# Patient Record
Sex: FEMALE | Race: WHITE | NOT HISPANIC OR LATINO | Employment: STUDENT | URBAN - METROPOLITAN AREA
[De-identification: names, ages, dates, MRNs, and addresses within clinical notes are randomized per-mention and may not be internally consistent; named-entity substitution may affect disease eponyms.]

---
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
## 2024-01-30 ENCOUNTER — OFFICE VISIT (OUTPATIENT)
Dept: FAMILY MEDICINE CLINIC | Facility: CLINIC | Age: 19
End: 2024-01-30
Payer: COMMERCIAL

## 2024-01-30 VITALS
DIASTOLIC BLOOD PRESSURE: 72 MMHG | OXYGEN SATURATION: 91 % | SYSTOLIC BLOOD PRESSURE: 110 MMHG | BODY MASS INDEX: 28.41 KG/M2 | HEART RATE: 89 BPM | HEIGHT: 62 IN | WEIGHT: 154.4 LBS | TEMPERATURE: 96.8 F

## 2024-01-30 DIAGNOSIS — B94.8 PANDAS (PEDIATRIC AUTOIMMUNE NEUROPSYCHIATRIC DISEASE ASSOCIATED WITH STREPTOCOCCAL INFECTION): ICD-10-CM

## 2024-01-30 DIAGNOSIS — Z13.1 ENCOUNTER FOR SCREENING FOR DIABETES MELLITUS: ICD-10-CM

## 2024-01-30 DIAGNOSIS — F41.8 ANXIETY WITH DEPRESSION: ICD-10-CM

## 2024-01-30 DIAGNOSIS — Z76.89 ESTABLISHING CARE WITH NEW DOCTOR, ENCOUNTER FOR: Primary | ICD-10-CM

## 2024-01-30 DIAGNOSIS — Z13.6 ENCOUNTER FOR SCREENING FOR CARDIOVASCULAR DISORDERS: ICD-10-CM

## 2024-01-30 DIAGNOSIS — D89.89 PANDAS (PEDIATRIC AUTOIMMUNE NEUROPSYCHIATRIC DISEASE ASSOCIATED WITH STREPTOCOCCAL INFECTION): ICD-10-CM

## 2024-01-30 PROCEDURE — 99214 OFFICE O/P EST MOD 30 MIN: CPT | Performed by: NURSE PRACTITIONER

## 2024-01-30 NOTE — ASSESSMENT & PLAN NOTE
Patient diagnosed with anxiety by therapist 2 years ago.  Was initially on fluoxetine 5 mg, just had increase in dose, but patient did not start medication until 4 weeks ago.  Patient denies SI/HI.  Wishes to get reconnected with behavioral health therapist.  Referrals entered.  Additional resources given to patient via handout.  Advised to obtain blood work as ordered, return in 4 weeks for reevaluation

## 2024-01-30 NOTE — PROGRESS NOTES
Name: Cristina Pemberton      : 2005      MRN: [id number]  Encounter Provider: EVELIO Greco  Encounter Date: 2024   Encounter department: [address]    Assessment & Plan     1. Establishing care with new doctor, encounter for    2. Anxiety with depression  Assessment & Plan:  Patient diagnosed with anxiety by therapist 2 years ago.  Was initially on fluoxetine 5 mg, just had increase in dose, but patient did not start medication until 4 weeks ago.  Patient denies SI/HI.  Wishes to get reconnected with behavioral health therapist.  Referrals entered.  Additional resources given to patient via handout.  Advised to obtain blood work as ordered, return in 4 weeks for reevaluation    Orders:  -     Comprehensive metabolic panel; Future  -     CBC and differential; Future  -     TSH, 3rd generation with Free T4 reflex; Future  -     Vitamin D 25 hydroxy; Future  -     Vitamin B12; Future  -     Folate; Future  -     Ambulatory referral to Psych Services; Future    3. Encounter for screening for cardiovascular disorders  -     Lipid panel; Future    4. Encounter for screening for diabetes mellitus  -     Comprehensive metabolic panel; Future  -     Hemoglobin A1C; Future    5. PANDAS (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)   Assessment & Plan:  Patient currently denies symptoms at this time.  Was referred to neurology, but never followed up with them.  Continue to monitor, recent referral to neurology if needed.    Orders:  -     Ambulatory referral to Psych Services; Future         Depression Screening Follow-up Plan: Patient's depression screening was positive with a PHQ-2 score of 4. Their PHQ-9 score was 12. Patient assessed for underlying major depression. They have no active suicidal ideations. Brief counseling provided and recommend additional follow-up/re-evaluation next office visit.    Subjective       Patient presents office for establishment of care.  Recently seen by [facility].  Last well exam August 2023.  Patient is established with OB/GYN.  Patient with history of POTS, anxiety, migraines.  Patient was established with behavioral health therapist, but then her therapist left the practice and she has not been able to establish with a new one.  Patient currently being treated for anxiety with fluoxetine.  Had recent increase in dose, but did not start new dose right away until 4 weeks ago.  Patient denies panic attacks, physical symptoms.  Denies SI/HI, auditory/visual hallucinations.      Review of Systems   Constitutional:  Negative for activity change, appetite change and fatigue.   HENT:  Negative for sore throat and trouble swallowing.    Eyes:  Negative for photophobia and visual disturbance.   Respiratory:  Negative for cough and shortness of breath.    Cardiovascular:  Negative for chest pain and palpitations.   Gastrointestinal:  Negative for abdominal pain, diarrhea and vomiting.   Genitourinary:  Negative for decreased urine volume.   Musculoskeletal:  Negative for arthralgias and myalgias.   Skin:  Negative for color change and rash.   Neurological:  Negative for dizziness, weakness, light-headedness and headaches.   Psychiatric/Behavioral:  Negative for agitation, confusion, dysphoric mood, self-injury, sleep disturbance and suicidal ideas. The patient is not nervous/anxious.        Current Outpatient Medications on File Prior to Visit   Medication Sig    ascorbic acid (VITAMIN C) 250 mg tablet Take 250 mg by mouth daily    betamethasone, augmented, (DIPROLENE-AF) 0.05 % cream Apply topically 2 (two) times a day    BIOTIN PO Take 1 capsule by mouth daily    cetirizine (ZyrTEC) 10 mg tablet Take 10 mg by mouth daily    FLUoxetine (PROzac) 20 mg capsule Take 1 capsule (20 mg total) by mouth daily    VITAMIN D PO Take by mouth    metoprolol succinate (TOPROL-XL) 25 mg 24 hr  "tablet Take 1 tablet (25 mg total) by mouth daily (Patient not taking: Reported on 1/30/2024)    [DISCONTINUED] metoclopramide (REGLAN) 10 mg tablet Take 1 tablet (10 mg total) by mouth every 6 (six) hours as needed (n/v or headache) (Patient not taking: Reported on 1/30/2024)    [DISCONTINUED] midodrine (PROAMATINE) 5 mg tablet Take 5 mg by mouth 2 (two) times a day (Patient not taking: Reported on 1/30/2024)    [DISCONTINUED] naproxen (NAPROSYN) 500 mg tablet Take 1 tablet (500 mg total) by mouth 2 (two) times a day with meals (Patient not taking: Reported on 1/30/2024)    [DISCONTINUED] ondansetron (ZOFRAN-ODT) 4 mg disintegrating tablet Take 1 tablet (4 mg total) by mouth every 8 (eight) hours as needed for nausea or vomiting (Patient not taking: Reported on 1/30/2024)       Objective     /72 (BP Location: Left arm, Patient Position: Sitting, Cuff Size: Standard)   Pulse 89   Temp (!) 96.8 °F (36 °C) (Tympanic)   Ht 5' 2\" (1.575 m)   Wt 70 kg (154 lb 6.4 oz)   LMP 12/22/2023 (Approximate)   SpO2 91% Comment: nails long and dark color  BMI 28.24 kg/m²     Physical Exam  Constitutional:       General: She is not in acute distress.     Appearance: Normal appearance. She is not ill-appearing.   HENT:      Head: Normocephalic and atraumatic.      Right Ear: Tympanic membrane, ear canal and external ear normal.      Left Ear: Tympanic membrane, ear canal and external ear normal.      Nose: Nose normal.      Mouth/Throat:      Mouth: Mucous membranes are moist.      Pharynx: Oropharynx is clear.   Eyes:      Conjunctiva/sclera: Conjunctivae normal.      Pupils: Pupils are equal, round, and reactive to light.   Cardiovascular:      Rate and Rhythm: Normal rate and regular rhythm.      Pulses: Normal pulses.      Heart sounds: Normal heart sounds. No murmur heard.  Pulmonary:      Effort: Pulmonary effort is normal.      Breath sounds: Normal breath sounds.   Abdominal:      General: Bowel sounds are normal. "      Palpations: Abdomen is soft.   Musculoskeletal:         General: Normal range of motion.      Cervical back: Normal range of motion and neck supple.   Skin:     General: Skin is warm and dry.   Neurological:      General: No focal deficit present.      Mental Status: She is alert and oriented to person, place, and time.   Psychiatric:         Attention and Perception: Attention and perception normal. She does not perceive auditory or visual hallucinations.         Mood and Affect: Mood and affect normal.         Speech: Speech normal.         Behavior: Behavior normal. Behavior is cooperative.         Thought Content: Thought content normal. Thought content does not include homicidal or suicidal ideation. Thought content does not include homicidal or suicidal plan.       EVELIO Greco

## 2024-02-02 ENCOUNTER — TELEPHONE (OUTPATIENT)
Dept: PSYCHIATRY | Facility: CLINIC | Age: 19
End: 2024-02-02

## 2024-02-02 NOTE — TELEPHONE ENCOUNTER
PSR attempted to reach out to patient in reference to referral placed by PCP for integrated therapist. PSR stated if patient is still interested to reach out to patient's PCP office or PSR directly to schedule with integrated therapist's wait list. PSR stated direct line.

## 2024-02-16 DIAGNOSIS — F41.9 ANXIETY: ICD-10-CM

## 2024-02-16 RX ORDER — FLUOXETINE HYDROCHLORIDE 20 MG/1
20 CAPSULE ORAL DAILY
Qty: 90 CAPSULE | Refills: 0 | Status: SHIPPED | OUTPATIENT
Start: 2024-02-16

## 2024-02-27 ENCOUNTER — TELEPHONE (OUTPATIENT)
Dept: FAMILY MEDICINE CLINIC | Facility: CLINIC | Age: 19
End: 2024-02-27

## 2024-02-27 NOTE — TELEPHONE ENCOUNTER
Called patient and n/a she was at work per mom. I did let her mom know that I was trying to reach her in regards to her apt today, she was supposed to have labs done but hasn't had the chance yet. Mom said she is afraid of needles so she probably is putting it off. She will make sure she has it done and will have Cristina call back to reschedule her apt after work.

## 2024-04-19 ENCOUNTER — OFFICE VISIT (OUTPATIENT)
Dept: FAMILY MEDICINE CLINIC | Facility: CLINIC | Age: 19
End: 2024-04-19
Payer: COMMERCIAL

## 2024-04-19 VITALS
DIASTOLIC BLOOD PRESSURE: 81 MMHG | HEART RATE: 73 BPM | WEIGHT: 148 LBS | SYSTOLIC BLOOD PRESSURE: 123 MMHG | HEIGHT: 62 IN | OXYGEN SATURATION: 98 % | TEMPERATURE: 98.1 F | BODY MASS INDEX: 27.23 KG/M2

## 2024-04-19 DIAGNOSIS — F41.8 ANXIETY WITH DEPRESSION: Primary | ICD-10-CM

## 2024-04-19 PROBLEM — R00.0 TACHYCARDIA: Status: RESOLVED | Noted: 2022-12-05 | Resolved: 2024-04-19

## 2024-04-19 PROBLEM — R10.2 PELVIC PAIN: Status: RESOLVED | Noted: 2023-03-24 | Resolved: 2024-04-19

## 2024-04-19 PROBLEM — N93.9 ABNORMAL UTERINE BLEEDING (AUB): Status: RESOLVED | Noted: 2023-03-24 | Resolved: 2024-04-19

## 2024-04-19 PROCEDURE — 99213 OFFICE O/P EST LOW 20 MIN: CPT | Performed by: FAMILY MEDICINE

## 2024-04-19 NOTE — LETTER
April 19, 2024     Patient: Cristina Pemberton  YOB: 2005  Date of Visit: 4/19/2024      To Whom it May Concern:    Cristina Pemberton is under my professional care. Cristina was seen in my office on 4/19/2024. Cristina is currently not on Fluoxetine and has not been taking this medication for the past 8 months.    If you have any questions or concerns, please don't hesitate to call.         Sincerely,          Jennifer Gaytan MD        CC: No Recipients

## 2024-04-19 NOTE — PROGRESS NOTES
Assessment/Plan:         Problem List Items Addressed This Visit        Behavioral Health    Anxiety with depression - Primary     Patient is doing well off medications. Letter provided.    Subjective:      Patient ID: Cristina Pemberton is a 19 y.o. female.    19 year old here for a review of medications. She was on Fluoxetine but stopped taking it about 8 months ago.  Anxiety has been okay, she says it is manageable.  She is planning to go into the [employer] and had to be off medications in order to qualify.  She needs a letter stating she is not on medications. She had trouble finding a therapist, so is not in therapy.         The following portions of the patient's history were reviewed and updated as appropriate:   Past Medical History:  She has a past medical history of Acute pharyngitis due to other specified organisms (10/28/2018), Anxiety, Anxiety with depression (1/30/2024), Asthma, Depression, Hamstring injury, left, initial encounter (05/31/2018), Headache(784.0), Heart murmur, Hypertension, PANDAS (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), and SIRS (systemic inflammatory response syndrome) (HCC) (12/05/2022).,  _______________________________________________________________________  Medical Problems:  does not have any pertinent problems on file.,  _______________________________________________________________________  Past Surgical History:   has a past surgical history that includes Eye surgery.,  _______________________________________________________________________  Family History:  family history includes ADD / ADHD in her father; Allergies in her father and mother; Arrhythmia in her paternal grandfather; Asthma in her sister; Breast cancer in her other and paternal grandmother; Endometriosis in her mother; Interstitial cystitis in her mother; Mental illness in her maternal grandmother; Miscarriages / Stillbirths in her mother; Thyroid disease in her mother;  "Uterine cancer in her other.,  _______________________________________________________________________  Social History:   reports that she has never smoked. She has never been exposed to tobacco smoke. She has never used smokeless tobacco. She reports current drug use. Drug: Marijuana. She reports that she does not drink alcohol.,  _______________________________________________________________________  Allergies:  is allergic to pollen extract..  _______________________________________________________________________  Current Outpatient Medications   Medication Sig Dispense Refill   • ascorbic acid (VITAMIN C) 250 mg tablet Take 250 mg by mouth daily     • betamethasone, augmented, (DIPROLENE-AF) 0.05 % cream Apply topically 2 (two) times a day 30 g 0   • BIOTIN PO Take 1 capsule by mouth daily     • cetirizine (ZyrTEC) 10 mg tablet Take 10 mg by mouth daily  5   • VITAMIN D PO Take by mouth       No current facility-administered medications for this visit.     _______________________________________________________________________  Review of Systems   Psychiatric/Behavioral:  Negative for dysphoric mood. The patient is not nervous/anxious.          Objective:  Vitals:    04/19/24 1404   BP: 123/81   Pulse: 73   Temp: 98.1 °F (36.7 °C)   SpO2: 98%   Weight: 67.1 kg (148 lb)   Height: 5' 2\" (1.575 m)     Body mass index is 27.07 kg/m².     Physical Exam  Vitals and nursing note reviewed.   Constitutional:       General: She is not in acute distress.     Appearance: She is well-developed. She is not diaphoretic.   HENT:      Head: Normocephalic and atraumatic.   Cardiovascular:      Rate and Rhythm: Normal rate and regular rhythm.   Pulmonary:      Effort: Pulmonary effort is normal. No respiratory distress.      Breath sounds: Normal breath sounds.   Neurological:      General: No focal deficit present.      Mental Status: She is alert.   Psychiatric:         Mood and Affect: Mood normal.         Behavior: Behavior " normal.         Thought Content: Thought content normal.         Judgment: Judgment normal.         ELBA-7 Flowsheet Screening    Flowsheet Row Most Recent Value   Over the last 2 weeks, how often have you been bothered by any of the following problems?    Feeling nervous, anxious, or on edge 1   Not being able to stop or control worrying 0   Worrying too much about different things 1   Trouble relaxing 0   Being so restless that it is hard to sit still 0   Becoming easily annoyed or irritable 0   Feeling afraid as if something awful might happen 0   ELBA-7 Total Score 2

## 2024-07-22 ENCOUNTER — NURSE TRIAGE (OUTPATIENT)
Age: 19
End: 2024-07-22

## 2024-07-22 NOTE — TELEPHONE ENCOUNTER
Pt calling with concerns for high testosterone levels - symptoms of late periods, excessive hair growth, cramping, acne, body odor. Not on birth control pills. Pt states has had these symptoms prior. Was placed on birth control pill for management, but is not interested in that option again. RN scheduled pt for next available visit. Pt agreeable to plan, no further questions.

## 2024-09-24 ENCOUNTER — COSMETIC (OUTPATIENT)
Age: 19
End: 2024-09-24

## 2024-09-24 VITALS
SYSTOLIC BLOOD PRESSURE: 96 MMHG | HEIGHT: 62 IN | OXYGEN SATURATION: 97 % | BODY MASS INDEX: 26.13 KG/M2 | DIASTOLIC BLOOD PRESSURE: 71 MMHG | TEMPERATURE: 97.7 F | WEIGHT: 142 LBS | HEART RATE: 84 BPM

## 2024-09-24 DIAGNOSIS — Z41.1 ENCOUNTER FOR COSMETIC SURGERY: Primary | ICD-10-CM

## 2024-09-24 PROCEDURE — COSCON COSMETIC CONSULTATION: Performed by: PLASTIC SURGERY

## 2024-09-24 NOTE — PROGRESS NOTES
Patient is a very pleasant 19-year-old female who is here today with her mother to discuss possible body contouring with a Brazilian butt lift.  Patient has no significant medical conditions, she is a non-smoker.  Her mother has some autoimmune related disease, however she has been tested and does not have  genetic markers for autoimmune disease or blood clot.  She is here today as she is unhappy with the shape and contour mainly of her lateral hip region.  The patient is extremely committed to a workout routine, she finds that despite her conditioning she finds this area difficult to achieve the contour that she wants.  She is interested today in possible grafting to the hip region and buttock.    Physical exam--patient with very mild to moderate localized adiposity in the forearms, abdomen and lateral flank, on the lower hip the patient does have a classic hip dip type contour deformity.  She overall has excellent shape and aesthetic contour to her buttock.      Discussion--I had a lengthy discussion with the patient regarding her options, I do feel that the patient would be an excellent candidate for fat grafting to the hip and buttock region.  We discussed donor site, I did discuss with her that donor site availability of her fat is her limiting issue as she would not have enough donor fat for a traditional Brazilian type butt lift  Which requires several liters of fat.  However using a combination of donor sites, particularly her lower flank and grafting to her hip depression would significantly improve her shape and contour with a mild to moderate improvement in her overall buttock volume.  I discussed the nature of fat grafting, the risks, benefits, alternatives including the risk of bleeding, infection, scarring, under correction, overcorrection, fat necrosis, fat emboli, need for revision, fat resorption, need for multiple procedures, asymmetry, poor aesthetic result, we discussed the expected hospital  stay, the expected recovery time, all her questions were answered to her satisfaction, overall patient is an excellent candidate although I did discuss with her that it may be difficult to completely correct the area.  Patient understands and agrees, she will be provided with pricing information and literature regarding her procedure.  She can follow-up if and when she decides on surgery.

## 2024-10-01 ENCOUNTER — TELEPHONE (OUTPATIENT)
Age: 19
End: 2024-10-01

## 2024-10-01 NOTE — TELEPHONE ENCOUNTER
Pt's mom Megan calling to check status of cosmetic quote from consult with Dr Jenkins on 9/24    Per Marquis Juarez emailed quote to email on [employer] yesterday, advised to be sure and check spam/junk folders    Mom would like Marquis to call her to discuss, as she is helping pt pay for and coordinate surgery    Please call mom Megan back at [phone]

## 2024-10-02 ENCOUNTER — TELEPHONE (OUTPATIENT)
Dept: PLASTIC SURGERY | Facility: CLINIC | Age: 19
End: 2024-10-02

## 2024-10-09 ENCOUNTER — PREP FOR PROCEDURE (OUTPATIENT)
Dept: PLASTIC SURGERY | Facility: CLINIC | Age: 19
End: 2024-10-09

## 2024-10-09 DIAGNOSIS — Z41.1 ENCOUNTER FOR COSMETIC SURGERY: Primary | ICD-10-CM

## 2024-10-15 ENCOUNTER — TELEPHONE (OUTPATIENT)
Age: 19
End: 2024-10-15

## 2024-10-15 NOTE — TELEPHONE ENCOUNTER
Patient will like for you to contact her regarding the surgery the different locations blood work & EKG

## 2024-10-25 NOTE — PROGRESS NOTES
Diagnoses and all orders for this visit:    Encounter for gynecological examination without abnormal finding    Screening for STDs (sexually transmitted diseases)  -     Hepatitis C antibody; Future  -     RPR-Syphilis Screening (Total Syphilis IGG/IGM); Future  -     Hepatitis B surface antigen; Future  -     HIV 1/2 AG/AB w Reflex [facility] for 2 yr old and above; Future  -     Chlamydia/GC amplified DNA by PCR        Perineal hygiene reviewed   Weight bearing exercises minium of 150 mins/weekly advised.   Kegel exercises recommended.   Reviewed safe sex practices, Condoms encouraged with all sexual activity to prevent STI's  SBE encouraged, ASCCP guidelines reviewed will start Pap's at age 21.   Gardisil vaccines recommended up to age 45  Calcium/ Vit D dietary requirements discussed,   Advised to call with any issues,  all concerns & questions addressed.   See after visit summary for further information and recommendations to the above mentioned subjects which we may or may not have covered in detail during your visit   F/U Annually and PRN      Health Maintenance:    Gardisil: Not completed   Gardasil 9 was advised for prevention of HPV-related disease. We discussed risks/benefits, SE's/AE's at length and all questions were answered. Written info was provided for review. The patient agrees to consider and is aware she may call to schedule injection #1 at any time.      Subjective    CC: Yearly Exam      Cristina Pemberton is a 19 y.o. female here for an annual exam.   GYN hx includes:      Family hx of: PGM- Breast cancer  (alive ) . Other family member with Breast cancer & uterine cancer   Medically stable, reports no changes in medical Hx, follows with PMD    Patient's last menstrual period was 10/16/2024 (exact date).  Her menstrual cycles are regular every 28-30 days. She denies issues with bleeding during her menses. Cycles last approx 4 days     She denies breast concerns, abnormal vaginal discharge,  vaginal itching, odor, irritation, bowel/bladder dysfunction, urinary symptoms, pelvic pain, or dyspareunia today.   She is sexually active, recent episode of unprotected intercourse with previous partner .  Her current method of contraception includes condoms. Denies any issues with her BCM.   Information provided on available birth control methods and discussion about morning-after pill  She does want STD testing today.  Denies intimate partner violence    Past Medical History:   Diagnosis Date    Acute pharyngitis due to other specified organisms 10/28/2018    Last Assessment & Plan:  Rapid Strep Negative-likely viral Treat with OTC cold medications and analgesics Throat Culture sent to lab Parents advised they will contacted with results within 48 hours if Positive    Anxiety     Anxiety with depression 01/30/2024    Asthma     Depression     Hamstring injury, left, initial encounter 05/31/2018    Headache(784.0)     Heart murmur     Hypertension     low    PANDAS (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)  ([facility])     SIRS (systemic inflammatory response syndrome) ([facility]) 12/05/2022     Past Surgical History:   Procedure Laterality Date    EYE SURGERY         Immunization History   Administered Date(s) Administered    DTP 2004    DTaP 2004, 2004, 2004, 06/01/2005    DTaP 5 2004, 2004, 06/01/2005, 05/20/2009    Hep A, adult 06/24/2010, 03/31/2011    Hep B / HiB 2004, 2004, 06/01/2005    Hep B, Adolescent or Pediatric 2004    Hep B, adult 2004, 2004, 06/01/2005    Hib (PRP-OMP) 2004, 2004, 06/01/2005    IPV 2004, 2004, 09/06/2005, 05/20/2009    MMR 03/04/2005, 04/16/2008    Meningococcal 08/28/2015    Meningococcal MCV4, Unspecified 06/09/2020    Meningococcal MCV4P 06/09/2020    Meningococcal, Unknown Serogroups 08/28/2015    Pneumococcal Conjugate PCV 7 2004, 2004,  2004, 2005    Tdap 2015    Tuberculin Skin Test-PPD Intradermal 2009    Varicella 2005, 2008       Family History   Problem Relation Age of Onset    Miscarriages / Stillbirths Mother     Thyroid disease Mother     Allergies Mother         Morphine    Interstitial cystitis Mother     Endometriosis Mother     Allergies Father         Ampicillin    ADD / ADHD Father     Asthma Sister     Mental illness Maternal Grandmother     Breast cancer Paternal Grandmother     Arrhythmia Paternal Grandfather     Breast cancer Other     Uterine cancer Other      Social History     Tobacco Use    Smoking status: Never     Passive exposure: Current    Smokeless tobacco: Never   Vaping Use    Vaping status: Never Used   Substance Use Topics    Alcohol use: Yes     Comment: social    Drug use: Yes     Types: Marijuana       Current Outpatient Medications:     ascorbic acid (VITAMIN C) 250 mg tablet, Take 250 mg by mouth daily, Disp: , Rfl:     BIOTIN PO, Take 1 capsule by mouth daily, Disp: , Rfl:     cetirizine (ZyrTEC) 10 mg tablet, Take 10 mg by mouth daily, Disp: , Rfl: 5    VITAMIN D PO, Take by mouth, Disp: , Rfl:   Patient Active Problem List    Diagnosis Date Noted    Anxiety with depression 2024    Migraine with aura and without status migrainosus, not intractable 05/15/2023    Hand, foot and mouth disease 05/15/2023    Environmental and seasonal allergies 2019    History of PANDAS 10/28/2018    Neuropathic pain, leg, bilateral 2017    Neurologic gait dysfunction 2017    Cough variant asthma 2017    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 2005       Allergies   Allergen Reactions    Pollen Extract        OB History    Para Term  AB Living   0 0 0 0 0 0   SAB IAB Ectopic Multiple Live Births   0 0 0 0 0       Vitals:    10/30/24 1333   BP: 110/68   BP Location: Left arm   Patient Position: Sitting   Cuff Size: Large   Weight: 64.9 kg (143  "lb)   Height: 5' 2\" (1.575 m)     Body mass index is 26.16 kg/m².    Review of Systems     Constitutional: Negative for chills, fatigue, fever, headaches, visual disturbances, and unexpected weight change.   Respiratory: Negative for cough, & shortness of breath.  Cardiovascular: Negative for chest pain. .    Gastrointestinal: Negative for Abd pain, nausea & vomiting, constipation and diarrhea.   Genitourinary: Negative for difficulty urinating, dysuria, hematuria, dyspareunia, unusual vaginal bleeding or discharge  Skin: Negative skin changes    Physical Exam     Constitutional: Alert & Oriented x3, well-developed and well-nourished. No distress.   HENT: Atraumatic, Normocephalic, Conjunctivae clear  Neck: Normal range of motion. Neck supple. No thyromegaly, mass, nodules or tenderness  Pulmonary: Effort normal.   Abdominal: Soft. No tenderness or masses  Musculoskeletal: Normal ROM  Skin: Warm & Dry  Psychological: Normal mood, thought content, behavior & judgement     Breasts:   Right: tissue soft without masses, tenderness, skin changes or nipple discharge. No areas of erythema or pain. No subclavicular, axillary, pectoral adenopathy  Left:  tissue soft without masses, tenderness, skin changes or nipple discharge. No areas of erythema or pain. No subclavicular, axillary, pectoral adenopathy    Pelvic exam was performed with patient supine, lithotomy position.      Labia: Negative rash, tenderness, lesion or injury on the right labia.              Negative rash, tenderness, lesion or injury on the left labia.   Urethral meatus:  Negative for  tenderness, inflammation or discharge.   Uterus: not deviated, enlarged, fixed or tender.   Cervix: No CMT, no discharge or friability.   Right adnexa: no mass, no tenderness and no fullness.  Left adnexa: no mass, no tenderness and no fullness.   Vagina: No erythema, tenderness, masses, or foreign body in the vagina. No signs of injury around the vagina. No unusual vaginal " discharge   Perineum without lesions, signs of injury, erythema or swelling.  Inguinal Canal:        Right: No inguinal adenopathy or hernia present.        Left: No inguinal adenopathy or hernia present.

## 2024-10-29 ENCOUNTER — APPOINTMENT (OUTPATIENT)
Dept: PREADMISSION TESTING | Facility: HOSPITAL | Age: 19
End: 2024-10-29
Payer: SELF-PAY

## 2024-10-30 ENCOUNTER — ANNUAL EXAM (OUTPATIENT)
Dept: OBGYN CLINIC | Facility: CLINIC | Age: 19
End: 2024-10-30
Payer: COMMERCIAL

## 2024-10-30 ENCOUNTER — APPOINTMENT (OUTPATIENT)
Dept: LAB | Facility: HOSPITAL | Age: 19
End: 2024-10-30
Payer: SELF-PAY

## 2024-10-30 VITALS
BODY MASS INDEX: 26.31 KG/M2 | SYSTOLIC BLOOD PRESSURE: 110 MMHG | DIASTOLIC BLOOD PRESSURE: 68 MMHG | WEIGHT: 143 LBS | HEIGHT: 62 IN

## 2024-10-30 DIAGNOSIS — Z01.419 ENCOUNTER FOR GYNECOLOGICAL EXAMINATION WITHOUT ABNORMAL FINDING: Primary | ICD-10-CM

## 2024-10-30 DIAGNOSIS — Z11.3 SCREENING FOR STDS (SEXUALLY TRANSMITTED DISEASES): ICD-10-CM

## 2024-10-30 DIAGNOSIS — Z13.1 ENCOUNTER FOR SCREENING FOR DIABETES MELLITUS: ICD-10-CM

## 2024-10-30 DIAGNOSIS — F41.8 ANXIETY WITH DEPRESSION: ICD-10-CM

## 2024-10-30 LAB
25(OH)D3 SERPL-MCNC: 26.9 NG/ML (ref 30–100)
ALBUMIN SERPL BCG-MCNC: 4.4 G/DL (ref 3.5–5)
ALP SERPL-CCNC: 49 U/L (ref 34–104)
ALT SERPL W P-5'-P-CCNC: 20 U/L (ref 7–52)
ANION GAP SERPL CALCULATED.3IONS-SCNC: 4 MMOL/L (ref 4–13)
AST SERPL W P-5'-P-CCNC: 19 U/L (ref 13–39)
BASOPHILS # BLD AUTO: 0.03 THOUSANDS/ÂΜL (ref 0–0.1)
BASOPHILS NFR BLD AUTO: 0 % (ref 0–1)
BILIRUB SERPL-MCNC: 0.46 MG/DL (ref 0.2–1)
BUN SERPL-MCNC: 16 MG/DL (ref 5–25)
CALCIUM SERPL-MCNC: 9.4 MG/DL (ref 8.4–10.2)
CHLORIDE SERPL-SCNC: 104 MMOL/L (ref 96–108)
CO2 SERPL-SCNC: 31 MMOL/L (ref 21–32)
CREAT SERPL-MCNC: 0.78 MG/DL (ref 0.6–1.3)
EOSINOPHIL # BLD AUTO: 0.12 THOUSAND/ÂΜL (ref 0–0.61)
EOSINOPHIL NFR BLD AUTO: 1 % (ref 0–6)
ERYTHROCYTE [DISTWIDTH] IN BLOOD BY AUTOMATED COUNT: 12.5 % (ref 11.6–15.1)
EST. AVERAGE GLUCOSE BLD GHB EST-MCNC: 97 MG/DL
FOLATE SERPL-MCNC: >22.3 NG/ML
GFR SERPL CREATININE-BSD FRML MDRD: 109 ML/MIN/1.73SQ M
GLUCOSE SERPL-MCNC: 75 MG/DL (ref 65–140)
HBA1C MFR BLD: 5 %
HCT VFR BLD AUTO: 39.1 % (ref 34.8–46.1)
HGB BLD-MCNC: 13 G/DL (ref 11.5–15.4)
IMM GRANULOCYTES # BLD AUTO: 0.04 THOUSAND/UL (ref 0–0.2)
IMM GRANULOCYTES NFR BLD AUTO: 0 % (ref 0–2)
LYMPHOCYTES # BLD AUTO: 3.18 THOUSANDS/ÂΜL (ref 0.6–4.47)
LYMPHOCYTES NFR BLD AUTO: 27 % (ref 14–44)
MCH RBC QN AUTO: 30.3 PG (ref 26.8–34.3)
MCHC RBC AUTO-ENTMCNC: 33.2 G/DL (ref 31.4–37.4)
MCV RBC AUTO: 91 FL (ref 82–98)
MONOCYTES # BLD AUTO: 0.75 THOUSAND/ÂΜL (ref 0.17–1.22)
MONOCYTES NFR BLD AUTO: 6 % (ref 4–12)
NEUTROPHILS # BLD AUTO: 7.6 THOUSANDS/ÂΜL (ref 1.85–7.62)
NEUTS SEG NFR BLD AUTO: 66 % (ref 43–75)
NRBC BLD AUTO-RTO: 0 /100 WBCS
PLATELET # BLD AUTO: 255 THOUSANDS/UL (ref 149–390)
PMV BLD AUTO: 8.9 FL (ref 8.9–12.7)
POTASSIUM SERPL-SCNC: 3.4 MMOL/L (ref 3.5–5.3)
PROT SERPL-MCNC: 7.3 G/DL (ref 6.4–8.4)
RBC # BLD AUTO: 4.29 MILLION/UL (ref 3.81–5.12)
SODIUM SERPL-SCNC: 139 MMOL/L (ref 135–147)
TSH SERPL DL<=0.05 MIU/L-ACNC: 1.08 UIU/ML (ref 0.45–4.5)
VIT B12 SERPL-MCNC: 432 PG/ML (ref 180–914)
WBC # BLD AUTO: 11.72 THOUSAND/UL (ref 4.31–10.16)

## 2024-10-30 PROCEDURE — 82746 ASSAY OF FOLIC ACID SERUM: CPT

## 2024-10-30 PROCEDURE — 36415 COLL VENOUS BLD VENIPUNCTURE: CPT

## 2024-10-30 PROCEDURE — 86803 HEPATITIS C AB TEST: CPT

## 2024-10-30 PROCEDURE — 87591 N.GONORRHOEAE DNA AMP PROB: CPT | Performed by: OBSTETRICS & GYNECOLOGY

## 2024-10-30 PROCEDURE — S0612 ANNUAL GYNECOLOGICAL EXAMINA: HCPCS | Performed by: OBSTETRICS & GYNECOLOGY

## 2024-10-30 PROCEDURE — 80053 COMPREHEN METABOLIC PANEL: CPT

## 2024-10-30 PROCEDURE — 82306 VITAMIN D 25 HYDROXY: CPT

## 2024-10-30 PROCEDURE — 86780 TREPONEMA PALLIDUM: CPT

## 2024-10-30 PROCEDURE — 83036 HEMOGLOBIN GLYCOSYLATED A1C: CPT

## 2024-10-30 PROCEDURE — 87389 HIV-1 AG W/HIV-1&-2 AB AG IA: CPT

## 2024-10-30 PROCEDURE — 85025 COMPLETE CBC W/AUTO DIFF WBC: CPT

## 2024-10-30 PROCEDURE — 87491 CHLMYD TRACH DNA AMP PROBE: CPT | Performed by: OBSTETRICS & GYNECOLOGY

## 2024-10-30 PROCEDURE — 87340 HEPATITIS B SURFACE AG IA: CPT

## 2024-10-30 PROCEDURE — 82607 VITAMIN B-12: CPT

## 2024-10-30 PROCEDURE — 84443 ASSAY THYROID STIM HORMONE: CPT

## 2024-10-30 NOTE — PATIENT INSTRUCTIONS
Patient Education     Lowering Your Risk of Breast Cancer   About this topic   Breast cancer is a serious illness. Breast cancer is when abnormal cells grow and divide more quickly in your breast. These cells form a growth or tumor. The abnormal cells may enter nearby tissue and spread to other parts of the body. It is the type of cancer most often seen in women. Men can have breast cancer, but it is a rare condition.  General   Some things in your life may increase your risk of breast cancer. You may not be able to change some of these. Others you can control.  You are more likely to get breast cancer if you:  Have a mother, sister, or daughter who has had breast cancer  Have used hormones for menopause for more than 5 years  Have had radiation therapy to the breast or chest in the past  Are overweight or do not exercise  Had your first menstrual period before you were 10 years old  Went through menopause after age 54  Have never been pregnant or had your first child after age 34  Have had breast cancer before  Drink alcohol in any form  Have dense breasts  Are older in age  There is no certain way to prevent breast cancer. There are things you can do to lower your chances of having breast cancer.  Keep a healthy weight. Lose weight if you are overweight. Being overweight raises your chances of having breast cancer.  Eat a healthy diet to maintain a healthy weight, such as more fruits, vegetables, and lean cuts of meat. Decrease the amount of saturated fat in your diet.  Exercise. Being active helps you keep a healthy weight.  Limit your alcohol intake or do not drink alcohol. The more alcohol you drink, the higher your risk.  Do not smoke cigarettes. Smoking can increase your risk of many types of cancer.  Breastfeed your baby. This may help protect you. The longer you breastfeed, the more protection you have.  Talk with your doctor about:  Limiting or stopping hormone therapy.  Taking certain drugs to prevent  breast cancer. For women at high risk of having breast cancer, there are a few drugs that may lower your risk.  Surgery to prevent you from having breast cancer if you are very high risk.  When do I need to call the doctor?   Changes in your breasts  A lump or area in your breast that feels different  Discharge from your nipple  Skin on your breast is dimpled or indented  You have questions or concerns about your breasts  Helpful tips   Talk to your doctor about the best kind of breast cancer screening for you.  If you want to do self breast exams, have your doctor show you the right way to do them.  Tell your doctor of any abnormal finding.  Last Reviewed Date   2021-10-04  Consumer Information Use and Disclaimer   This generalized information is a limited summary of diagnosis, treatment, and/or medication information. It is not meant to be comprehensive and should be used as a tool to help the user understand and/or assess potential diagnostic and treatment options. It does NOT include all information about conditions, treatments, medications, side effects, or risks that may apply to a specific patient. It is not intended to be medical advice or a substitute for the medical advice, diagnosis, or treatment of a health care provider based on the health care provider's examination and assessment of a patient’s specific and unique circumstances. Patients must speak with a health care provider for complete information about their health, medical questions, and treatment options, including any risks or benefits regarding use of medications. This information does not endorse any treatments or medications as safe, effective, or approved for treating a specific patient. UpToDate, Inc. and its affiliates disclaim any warranty or liability relating to this information or the use thereof. The use of this information is governed by the Terms of Use, available at  https://www.[employer] Technologiesuwer.com/en/know/clinical-effectiveness-terms   Copyright   Copyright © 2024 [employer]Date, Inc. and its affiliates and/or licensors. All rights reserved.       Perineal Hygiene      Your vaginal naturally takes care of its self, it is a self washing system, the less you mess the healthier it will be     No soaps or feminine wash to the vulva, these products can cause dermitis, bacterial infections and other vulvar problems.   Use only water to cleanse, or water with Dove or Dove Sensitive Skin Bar soap if necessary.    Avoid the use of washcloths, exfoliating dmitry's, netted scrubbers, loofa's, use your hands only to cleanse the vulvar tissues    No scented lotions or products are advised in or near your vulva.    Use coconut oil in its solid form for moisture if needed.  No douching this may cause imbalance in your vaginal PH and further issues.    If you wear panty liners, you may apply a thin coating of Vaseline, A&D ointment or coconut oil to the vulvar tissues as a skin barrier     Cotton underware, loose fitting clothing  Only perfume-free, dye-free laundry detergent, use a second rinse cycle   Avoid fabric softeners/dryer sheets.       Your partner should avoid the same products as well.       Over the counter probiotic to restore vaginal susannah may be helpful as well, take daily.       You may also look into Boric Acid vaginal suppositories to restore vaginal PH balance for up to 2 weeks as directed on the box. You may not use these if you are pregnant      For vaginal dryness:      You may use:     Coconut oil in solid form, not liquid (organic, pure, unscented) as needed for moisture or lubrication. ( Do not use if allergic)       Replens moisture restore external comfort gel daily ( use as directed on the box)        Replens long lasting vaginal moisturizer  ( use as directed on the box)     May try CribFrog vulvar moisturizer ( found on Amazon )    May try Revaree vaginal inserts        For  "Vaginal Lubrication:          You may use:     Coconut oil in solid form, not liquid (organic, pure, unscented) as a lubricant or another scent-free lubricant (Astroglide, Uberlube) if needed.  Do not use coconut oil or silicone if using a condom as this may break down the integrity of the condom and cause an unplanned pregnancy              Do not use coconut oil if allergic               Replens silky smooth lubricant, premium silicone based lubricant for intercourse. ( use as directed, a small amount will provide an enhanced natural feeling)     Any premium over the counter vaginal lubricant water or silicone based. Silicone based will have more staying power.        For Vulvar irritation/itching:        You may use Hydrocortisone 1 % over the counter to external vulvar tissues 2 x daily for 5-7 days to help with irriation and itch relief.  Patient Education     HPV (Human Papillomavirus) Vaccine CDC Vaccine Information Statement (VIS)   About this topic       Patient Education     Sexually transmitted infections   The Basics   Written by the doctors and editors at UpToDate   What are sexually transmitted infections? -- Sexually transmitted infections (\"STIs\") are infections you can get through sex. They are also called sexually transmitted diseases (\"STDs\"). Most STIs are caused by bacteria or viruses.  The most common STIs include:   Chlamydia   Gonorrhea   Mycoplasma genitalium   Genital herpes, also called herpes simplex virus (\"HSV\")   Genital warts, also called human papillomavirus (\"HPV\") - Some types of HPV can cause cancer of the cervix, penis, or anus.   Hepatitis B   Syphilis   Trichomoniasis   Human immunodeficiency virus (\"HIV\") - This is the virus that causes AIDS.  Many of these infections can be spread through any type of sex. That includes vaginal, anal, and oral sex, as well as other types of sex play. HIV and hepatitis B can be spread in other ways, too, such as exposure to body fluids.  Other " "infections can also be spread through sex. These include:   Hepatitis A and hepatitis C - These are other types of hepatitis viruses. They are usually spread in other ways. But they can also be spread through sex.   Zika virus - This is usually spread through mosquito bites. But it can also be spread through sex.   Neisseria meningitidis - This can be spread during close contact, including through sex. It has caused small outbreaks of bacterial meningitis in males who have sex with males.   Mpox (also known as monkeypox) - This is a viral infection that can be spread during close contact, including through sex.  What is STI screening? -- \"Screening\" means testing for STIs in a person who does not have symptoms. Screening is very important. That's because STIs often do not cause symptoms, so a person can have an STI and not know it.  Doctors recommend that people who are at risk for STIs be screened even if they feel healthy. For example, you could be at risk for chlamydia if you had unprotected sex with a new partner. Screening for chlamydia will show if you do have this infection, so you can get treatment. Treatment will prevent the infection from getting worse and keep you from infecting other people.  There are different types of tests that screen for different infections. Many STIs can be found through a blood or urine test. If you decide to get screened for STIs, your doctor or nurse can work with you to figure out which specific tests you need.  Who should be tested for STIs? -- If you think you might have an STI, or if you had sex with someone who you know has an STI, you should get tested.  For screening, different tests are appropriate for different people, depending on their sex and sex habits. Here are some general guidelines:   All people (including teens) should get screened at least once for HIV.   All females younger than 25 years who have had sex should be screened every year for gonorrhea and " chlamydia.   Females older than 25 who have sex with more than 1 partner and do not use condoms should be screened every year for gonorrhea and chlamydia.   Males who have sex with males should be screened at least once a year for HIV, syphilis, chlamydia, and gonorrhea. This should include testing of any body parts that could be infected, including the rectum. Males who have sex with males should also be screened at least once for hepatitis A, B, and C.   Pregnant people should be screened for syphilis, HIV, and hepatitis B. They should also be screened for chlamydia and gonorrhea if they are younger than 25 years or have more than 1 sex partner. Some pregnant people might also need to be screened for other infections, depending on their sex habits.   All people who are infected with HIV should be screened at least once for hepatitis A, B, and C. They should also be screened at least once a year for syphilis, chlamydia, and gonorrhea. Females who are infected with HIV should be screened at least once a year for trichomonas. Males who are infected with HIV, and who have sex with males with HIV, should be screened at least once a year for hepatitis C.   Transgender and gender-diverse people should be screened based on their anatomy and sex habits.  Some people might need other screening tests depending on their sex habits and other factors. If you are unsure whether you should be screened and for what, ask your doctor or nurse for advice.  Where can I get an STI test? -- There are a few options:   If you see a doctor or nurse regularly, you can ask them to test you.   If you prefer, you can go to a clinic. To find a clinic near you, check with your local Department of Health or visit [email or web address]. Some clinics let you get tested without giving your name (anonymously).  Be careful with any pharmacies or online stores that sell kits to use at home to screen for STIs. For some of these tests, you send in a  "sample, and then you get the results either by phone or online. For others, you do a test at home and get results within an hour. But it is not always clear which test kits are ones you can trust. If you do use 1 of these kits and get a positive result, follow up with a doctor or nurse. And if you get a negative result but think that you might have an infection, see a doctor or nurse.  What symptoms should I watch for? -- In general, watch out for any genital itching, burning, sores, or discharge. But be aware that many STIs do not cause any symptoms. The best way to know for sure if you have an STI is to be tested.  How are STIs treated? -- The right treatment depends on the type of STI you have. Treatment might include antibiotics or medicines called \"antivirals,\" which fight viruses. Treatment can cure your infection or keep it from getting worse. It can also lower the chance that you spread the infection to others.  If you do have an infection, you might need to tell the people you could have infected. Your doctor or nurse can help you figure out which partners you need to tell based on when you last had sex with them.  Can STIs be prevented? -- There is no definite way to prevent all STIs. But there are things you can do to lower your chances of getting one:   The most important thing you can do is to use a condom every time you have sex. Both external (male) and internal (female) condoms can protect against STIs. But be aware that condoms made out of \"natural materials,\" such as sheep intestine, do not protect against STIs.   Ask your doctor if there are any vaccines you should get. If you are 26 years old or younger, you can get a vaccine to protect against HPV, the virus that causes genital warts and cervical cancer. Some older people can also get the HPV vaccine. If you do not have hepatitis A or B and have not already gotten the vaccine for hepatitis A or B, you can get those vaccines, too. Your doctor " might also suggest a meningitis or mpox vaccine if you are at risk.   If your partner has herpes, they can take a medicine called valacyclovir (brand name: Valtrex). This lowers the chances of passing the infection to you.   If you are at very high risk for certain STIs, talk to your doctor about whether you should take medicines to lower the risk. For example:   Taking an antiviral pill every day can lower the risk of getting HIV.   For some people, taking an antibiotic pill after sex can lower the risk of getting chlamydia, syphilis, and gonorrhea. But this does not work in everyone.  All topics are updated as new evidence becomes available and our peer review process is complete.  This topic retrieved from [employer] on: May 02, 2024.  Topic 17039 Version 17.0  Release: 32.4.3 - C32.122  © 2024 UpToDate, Inc. and/or its affiliates. All rights reserved.  Consumer Information Use and Disclaimer   Disclaimer: This generalized information is a limited summary of diagnosis, treatment, and/or medication information. It is not meant to be comprehensive and should be used as a tool to help the user understand and/or assess potential diagnostic and treatment options. It does NOT include all information about conditions, treatments, medications, side effects, or risks that may apply to a specific patient. It is not intended to be medical advice or a substitute for the medical advice, diagnosis, or treatment of a health care provider based on the health care provider's examination and assessment of a patient's specific and unique circumstances. Patients must speak with a health care provider for complete information about their health, medical questions, and treatment options, including any risks or benefits regarding use of medications. This information does not endorse any treatments or medications as safe, effective, or approved for treating a specific patient. UpToDate, Inc. and its affiliates disclaim any warranty or  liability relating to this information or the use thereof.The use of this information is governed by the Terms of Use, available at https://www.wolterskluwer.com/en/know/clinical-effectiveness-terms. 2024© UpToDate, Inc. and its affiliates and/or licensors. All rights reserved.  Copyright   © 2024 [employer] and/or its affiliates. All rights reserved.

## 2024-10-31 ENCOUNTER — TELEPHONE (OUTPATIENT)
Age: 19
End: 2024-10-31

## 2024-10-31 DIAGNOSIS — A74.9 CHLAMYDIA INFECTION: Primary | ICD-10-CM

## 2024-10-31 LAB
C TRACH DNA SPEC QL NAA+PROBE: POSITIVE
HBV SURFACE AG SER QL: NORMAL
HCV AB SER QL: NORMAL
HIV 1+2 AB+HIV1 P24 AG SERPL QL IA: NORMAL
HIV 2 AB SERPL QL IA: NORMAL
HIV1 AB SERPL QL IA: NORMAL
HIV1 P24 AG SERPL QL IA: NORMAL
N GONORRHOEA DNA SPEC QL NAA+PROBE: NEGATIVE
TREPONEMA PALLIDUM IGG+IGM AB [PRESENCE] IN SERUM OR PLASMA BY IMMUNOASSAY: NORMAL

## 2024-10-31 RX ORDER — DOXYCYCLINE 100 MG/1
100 TABLET ORAL 2 TIMES DAILY
Qty: 14 TABLET | Refills: 0 | Status: SHIPPED | OUTPATIENT
Start: 2024-10-31 | End: 2024-11-01 | Stop reason: SDUPTHER

## 2024-10-31 NOTE — TELEPHONE ENCOUNTER
Patient returning phone call and was notified of Teresa's recommendations, Pt verbalized understanding, no further questions at this time

## 2024-10-31 NOTE — RESULT ENCOUNTER NOTE
Pls advise pt her chlamydia was positive. I will send antibiotics to her pharmacy. Pls advise partner notification and treatment. Recheck in 3 months advised.    (Covering for Ewa)

## 2024-10-31 NOTE — TELEPHONE ENCOUNTER
----- Message from EVELIO Garcia sent at 10/31/2024  3:08 PM EDT -----  Pls advise pt her chlamydia was positive. I will send antibiotics to her pharmacy. Pls advise partner notification and treatment. Recheck in 3 months advised.    (Covering for Ewa)

## 2024-11-01 ENCOUNTER — TELEPHONE (OUTPATIENT)
Age: 19
End: 2024-11-01

## 2024-11-01 DIAGNOSIS — A74.9 CHLAMYDIA INFECTION: ICD-10-CM

## 2024-11-01 RX ORDER — DOXYCYCLINE 100 MG/1
100 TABLET ORAL 2 TIMES DAILY
Qty: 14 TABLET | Refills: 0 | Status: SHIPPED | OUTPATIENT
Start: 2024-11-01 | End: 2024-11-08

## 2024-11-01 NOTE — TELEPHONE ENCOUNTER
Patient called and advised she lost the medication she just picked up and would like it sent again.    doxycycline (ADOXA) 100 MG tablet    Unreliable history. Likely secondary to psychiatric issues   no need to rule out cardiac etiology or further tele monitoring including syncope work up per discussion with attending   EKG shows sinus bradycardia at 52 without any significant st or t wave changes   Orthostatic's negative   Troponin 1 is negative   No need for echo or carotid Dopplers at this time .F/u ct head to rule out any intracranial pathology or bleed

## 2024-11-01 NOTE — TELEPHONE ENCOUNTER
Spoke with patient regarding provider note below.  Pt verbalized understanding, no further questions or concerns at this time.       Hello,  All of your STD blood work is normal. Great news!     Enjoy your day.  Thank you,  Katherine FRASER

## 2024-11-01 NOTE — PRE-PROCEDURE INSTRUCTIONS
Pre-Surgery Instructions:   Medication Instructions    ascorbic acid (VITAMIN C) 250 mg tablet Avoid 1 week prior to surgery      BIOTIN PO Avoid 1 week prior to surgery      cetirizine (ZyrTEC) 10 mg tablet Do not take day of surgery; continue as prescribed excluding DOS     doxycycline (ADOXA) 100 MG tablet Continue as prescribed- should be completed prior to DOS- notify surgeon if infection persists    VITAMIN D PO Avoid 1 week prior to surgery      Medication instructions for day surgery reviewed. Please use only a sip of water to take your instructed medications. Avoid all over the counter vitamins, supplements and NSAIDS for one week prior to surgery per anesthesia guidelines. Tylenol is ok to take as needed.     You will receive a call one business day prior to surgery with an arrival time and hospital directions. If your surgery is scheduled on a Monday, the hospital will be calling you on the Friday prior to your surgery. If you have not heard from anyone by 8pm, please call the hospital supervisor through the hospital  at [phone]. ([place] [phone] or [place] [phone]).    Do not eat or drink anything after midnight the night before your surgery, including candy, mints, lifesavers, or chewing gum. Do not drink alcohol 24hrs before your surgery. Try not to smoke at least 24hrs before your surgery.       Follow the pre surgery showering instructions as listed in the “My Surgical Experience Booklet” or otherwise provided by your surgeon's office. Do not use a blade to shave the surgical area 1 week before surgery. It is okay to use a clean electric clippers up to 24 hours before surgery. Do not apply any lotions, creams, including makeup, cologne, deodorant, or perfumes after showering on the day of your surgery. Do not use dry shampoo, hair spray, hair gel, or any type of hair products.     No contact lenses, eye make-up, or artificial eyelashes. Remove nail polish, including gel  polish, and any artificial, gel, or acrylic nails if possible. Remove all jewelry including rings and body piercing jewelry.     Wear causal clothing that is easy to take on and off. Consider your type of surgery.    Keep any valuables, jewelry, piercings at home. Please bring any specially ordered equipment (sling, braces) if indicated.    Arrange for a responsible person to drive you to and from the hospital on the day of your surgery. Please confirm the visitor policy for the day of your procedure when you receive your phone call with an arrival time.     Call the surgeon's office with any new illnesses, exposures, or additional questions prior to surgery.    Please reference your “My Surgical Experience Booklet” for additional information to prepare for your upcoming surgery.

## 2024-11-06 ENCOUNTER — COSMETIC (OUTPATIENT)
Dept: PLASTIC SURGERY | Facility: CLINIC | Age: 19
End: 2024-11-06

## 2024-11-06 VITALS
DIASTOLIC BLOOD PRESSURE: 64 MMHG | TEMPERATURE: 97.9 F | HEIGHT: 62 IN | RESPIRATION RATE: 18 BRPM | SYSTOLIC BLOOD PRESSURE: 110 MMHG | BODY MASS INDEX: 26.68 KG/M2 | WEIGHT: 145 LBS | HEART RATE: 71 BPM | OXYGEN SATURATION: 99 %

## 2024-11-06 DIAGNOSIS — Z41.1 ENCOUNTER FOR COSMETIC SURGERY: Primary | ICD-10-CM

## 2024-11-08 ENCOUNTER — TELEPHONE (OUTPATIENT)
Dept: PLASTIC SURGERY | Facility: CLINIC | Age: 19
End: 2024-11-08

## 2024-11-11 ENCOUNTER — APPOINTMENT (OUTPATIENT)
Dept: LAB | Facility: HOSPITAL | Age: 19
End: 2024-11-11
Attending: PLASTIC SURGERY
Payer: SELF-PAY

## 2024-11-11 DIAGNOSIS — Z41.1 ENCOUNTER FOR COSMETIC SURGERY: ICD-10-CM

## 2024-11-11 LAB
ANION GAP SERPL CALCULATED.3IONS-SCNC: 8 MMOL/L (ref 4–13)
BASOPHILS # BLD AUTO: 0.02 THOUSANDS/ÂΜL (ref 0–0.1)
BASOPHILS NFR BLD AUTO: 0 % (ref 0–1)
BUN SERPL-MCNC: 16 MG/DL (ref 5–25)
CALCIUM SERPL-MCNC: 8.8 MG/DL (ref 8.4–10.2)
CHLORIDE SERPL-SCNC: 104 MMOL/L (ref 96–108)
CO2 SERPL-SCNC: 25 MMOL/L (ref 21–32)
CREAT SERPL-MCNC: 0.75 MG/DL (ref 0.6–1.3)
EOSINOPHIL # BLD AUTO: 0.19 THOUSAND/ÂΜL (ref 0–0.61)
EOSINOPHIL NFR BLD AUTO: 2 % (ref 0–6)
ERYTHROCYTE [DISTWIDTH] IN BLOOD BY AUTOMATED COUNT: 12.6 % (ref 11.6–15.1)
GFR SERPL CREATININE-BSD FRML MDRD: 115 ML/MIN/1.73SQ M
GLUCOSE P FAST SERPL-MCNC: 87 MG/DL (ref 65–99)
HCT VFR BLD AUTO: 40.9 % (ref 34.8–46.1)
HGB BLD-MCNC: 13.6 G/DL (ref 11.5–15.4)
IMM GRANULOCYTES # BLD AUTO: 0.02 THOUSAND/UL (ref 0–0.2)
IMM GRANULOCYTES NFR BLD AUTO: 0 % (ref 0–2)
LYMPHOCYTES # BLD AUTO: 2.31 THOUSANDS/ÂΜL (ref 0.6–4.47)
LYMPHOCYTES NFR BLD AUTO: 23 % (ref 14–44)
MCH RBC QN AUTO: 30.2 PG (ref 26.8–34.3)
MCHC RBC AUTO-ENTMCNC: 33.3 G/DL (ref 31.4–37.4)
MCV RBC AUTO: 91 FL (ref 82–98)
MONOCYTES # BLD AUTO: 0.63 THOUSAND/ÂΜL (ref 0.17–1.22)
MONOCYTES NFR BLD AUTO: 6 % (ref 4–12)
NEUTROPHILS # BLD AUTO: 6.98 THOUSANDS/ÂΜL (ref 1.85–7.62)
NEUTS SEG NFR BLD AUTO: 69 % (ref 43–75)
NRBC BLD AUTO-RTO: 0 /100 WBCS
PLATELET # BLD AUTO: 237 THOUSANDS/UL (ref 149–390)
PMV BLD AUTO: 8.6 FL (ref 8.9–12.7)
POTASSIUM SERPL-SCNC: 3.9 MMOL/L (ref 3.5–5.3)
RBC # BLD AUTO: 4.51 MILLION/UL (ref 3.81–5.12)
SODIUM SERPL-SCNC: 137 MMOL/L (ref 135–147)
WBC # BLD AUTO: 10.15 THOUSAND/UL (ref 4.31–10.16)

## 2024-11-11 PROCEDURE — 36415 COLL VENOUS BLD VENIPUNCTURE: CPT

## 2024-11-11 PROCEDURE — 85025 COMPLETE CBC W/AUTO DIFF WBC: CPT

## 2024-11-11 PROCEDURE — 80048 BASIC METABOLIC PNL TOTAL CA: CPT

## 2024-11-11 NOTE — PROGRESS NOTES
Assessment & Plan   19-year-old female for liposuction and fat grafting to the bilateral buttocks and hip  1.)  Reviewed the risks, benefits, alternatives of fat grafting, namely the risk of under correction  2.)  We identified her donor sites once again  3.)  All her questions answered to her satisfaction  4.)  Informed consent obtained and signed, will proceed to the OR as scheduled.          Discussion--I had a lengthy discussion with the patient regarding her options, I do feel that the patient would be an excellent candidate for fat grafting to the hip and buttock region.  We discussed donor site, I did discuss with her that donor site availability of her fat is her limiting issue as she would not have enough donor fat for a traditional Brazilian type butt lift  Which requires several liters of fat.  However using a combination of donor sites, particularly her lower flank and grafting to her hip depression would significantly improve her shape and contour with a mild to moderate improvement in her overall buttock volume.  I discussed the nature of fat grafting, the risks, benefits, alternatives including the risk of bleeding, infection, scarring, under correction, overcorrection, fat necrosis, fat emboli, need for revision, fat resorption, need for multiple procedures, asymmetry, poor aesthetic result, we discussed the expected hospital stay, the expected recovery time, all her questions were answered to her satisfaction, overall patient is an excellent candidate although I did discuss with her that it may be difficult to completely correct the area.  Patient understands and agrees, all her questions answered to her satisfaction.  Informed consent obtained and signed.  Will proceed to the OR as scheduled.            Subjective   Patient ID: Cristina Pemberton is a 19 y.o. female.    Vitals:    11/06/24 1434   BP: 110/64   Pulse: 71   Resp: 18   Temp: 97.9 °F (36.6 °C)   SpO2: 99%     HPI  Patient is a very pleasant  19-year-old female who is here today with her mother to discuss upcoming body contouring with a Brazilian butt lift.  Patient has no significant medical conditions, she is a non-smoker.  Her mother has some autoimmune related disease, however she has been tested and does not have  genetic markers for autoimmune disease or blood clot.  She is here today as she is unhappy with the shape and contour mainly of her lateral hip region.  The patient is extremely committed to a workout routine, she finds that despite her conditioning she finds this area difficult to achieve the contour that she wants.  We are planning for fat grafting to her buttock and hip region.  She is completing a course of antibiotics for chlamydia infection.      The following portions of the patient's history were reviewed and updated as appropriate: allergies, current medications, past family history, past medical history, past social history, past surgical history, and problem list.    Review of Systems   All other systems reviewed and are negative.    Objective   Physical Exam   Constitutional  She appears well-developed and well-nourished.     Eyes  Pupils are equal, round, and reactive to light. System normal.     General -             Right: Right eye extraocular movements are normal.             Left: Left eye extraocular movements are normal.     Cardiovascular: Normal rate and regular rhythm.     Pulmonary/Chest  Effort normal and breath sounds normal.     Skin    Physical exam--patient with very mild to moderate localized adiposity in the forearms, abdomen and lateral flank, on the lower hip the patient does have a classic hip dip type contour deformity.  She overall has excellent shape and aesthetic contour to her buttock.       Psychiatric  She has a normal mood and affect. Her behavior is normal. Judgment and thought content normal.       Past Medical History:   Diagnosis Date   • Acute pharyngitis due to other specified organisms  10/28/2018    Last Assessment & Plan:  Rapid Strep Negative-likely viral Treat with OTC cold medications and analgesics Throat Culture sent to lab Parents advised they will contacted with results within 48 hours if Positive   • Anxiety    • Anxiety with depression 01/30/2024   • Asthma    • Depression    • Hamstring injury, left, initial encounter 05/31/2018   • Headache(784.0)    • Heart murmur    • PANDAS (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)  ([facility])    • SIRS (systemic inflammatory response syndrome) ([facility]) 12/05/2022     Past Surgical History:   Procedure Laterality Date   • EYE SURGERY Left     muscle suregry     Current Outpatient Medications   Medication Instructions   • ascorbic acid (VITAMIN C) 250 mg, Oral, Daily   • BIOTIN PO 1 capsule, Oral, Daily   • cetirizine (ZYRTEC) 10 mg, Oral, Daily   • VITAMIN D PO Oral       Social History     Social History Narrative   • Not on file     Social History     Tobacco Use   Smoking Status Never   • Passive exposure: Current   Smokeless Tobacco Never

## 2024-11-12 ENCOUNTER — ANESTHESIA EVENT (OUTPATIENT)
Dept: PERIOP | Facility: HOSPITAL | Age: 19
End: 2024-11-12

## 2024-11-12 ENCOUNTER — HOSPITAL ENCOUNTER (OUTPATIENT)
Facility: HOSPITAL | Age: 19
Setting detail: OUTPATIENT SURGERY
Discharge: HOME | End: 2024-11-12
Attending: PLASTIC SURGERY | Admitting: PLASTIC SURGERY
Payer: SELF-PAY

## 2024-11-12 ENCOUNTER — ANESTHESIA (OUTPATIENT)
Dept: PERIOP | Facility: HOSPITAL | Age: 19
End: 2024-11-12

## 2024-11-12 VITALS
SYSTOLIC BLOOD PRESSURE: 95 MMHG | RESPIRATION RATE: 18 BRPM | HEIGHT: 62 IN | WEIGHT: 146.39 LBS | OXYGEN SATURATION: 100 % | BODY MASS INDEX: 26.94 KG/M2 | DIASTOLIC BLOOD PRESSURE: 48 MMHG | HEART RATE: 103 BPM | TEMPERATURE: 97.2 F

## 2024-11-12 DIAGNOSIS — Z41.1 ENCOUNTER FOR COSMETIC SURGERY: Primary | ICD-10-CM

## 2024-11-12 LAB
EXT PREGNANCY TEST URINE: NEGATIVE
EXT. CONTROL: NORMAL

## 2024-11-12 PROCEDURE — NC001 PR NO CHARGE: Performed by: PLASTIC SURGERY

## 2024-11-12 PROCEDURE — 81025 URINE PREGNANCY TEST: CPT | Performed by: PHYSICIAN ASSISTANT

## 2024-11-12 PROCEDURE — 15771 GRFG AUTOL FAT LIPO 50 CC/<: CPT | Performed by: PLASTIC SURGERY

## 2024-11-12 RX ORDER — SODIUM CHLORIDE, SODIUM LACTATE, POTASSIUM CHLORIDE, CALCIUM CHLORIDE 600; 310; 30; 20 MG/100ML; MG/100ML; MG/100ML; MG/100ML
INJECTION, SOLUTION INTRAVENOUS CONTINUOUS PRN
Status: DISCONTINUED | OUTPATIENT
Start: 2024-11-12 | End: 2024-11-12

## 2024-11-12 RX ORDER — PROPOFOL 10 MG/ML
INJECTION, EMULSION INTRAVENOUS AS NEEDED
Status: DISCONTINUED | OUTPATIENT
Start: 2024-11-12 | End: 2024-11-12

## 2024-11-12 RX ORDER — SODIUM CHLORIDE 9 MG/ML
125 INJECTION, SOLUTION INTRAVENOUS CONTINUOUS
Status: DISCONTINUED | OUTPATIENT
Start: 2024-11-12 | End: 2024-11-12 | Stop reason: HOSPADM

## 2024-11-12 RX ORDER — MAGNESIUM HYDROXIDE 1200 MG/15ML
LIQUID ORAL AS NEEDED
Status: DISCONTINUED | OUTPATIENT
Start: 2024-11-12 | End: 2024-11-12 | Stop reason: HOSPADM

## 2024-11-12 RX ORDER — ACETAMINOPHEN 325 MG/1
975 TABLET ORAL ONCE
Status: COMPLETED | OUTPATIENT
Start: 2024-11-12 | End: 2024-11-12

## 2024-11-12 RX ORDER — ACETAMINOPHEN 500 MG
1000 TABLET ORAL EVERY 6 HOURS
Qty: 56 TABLET | Refills: 0 | Status: SHIPPED | OUTPATIENT
Start: 2024-11-12 | End: 2024-11-19

## 2024-11-12 RX ORDER — DEXAMETHASONE SODIUM PHOSPHATE 10 MG/ML
INJECTION, SOLUTION INTRAMUSCULAR; INTRAVENOUS AS NEEDED
Status: DISCONTINUED | OUTPATIENT
Start: 2024-11-12 | End: 2024-11-12

## 2024-11-12 RX ORDER — HYDROMORPHONE HCL/PF 1 MG/ML
SYRINGE (ML) INJECTION AS NEEDED
Status: DISCONTINUED | OUTPATIENT
Start: 2024-11-12 | End: 2024-11-12

## 2024-11-12 RX ORDER — EPHEDRINE SULFATE 50 MG/ML
INJECTION INTRAVENOUS AS NEEDED
Status: DISCONTINUED | OUTPATIENT
Start: 2024-11-12 | End: 2024-11-12

## 2024-11-12 RX ORDER — PHENYLEPHRINE HCL IN 0.9% NACL 1 MG/10 ML
SYRINGE (ML) INTRAVENOUS AS NEEDED
Status: DISCONTINUED | OUTPATIENT
Start: 2024-11-12 | End: 2024-11-12

## 2024-11-12 RX ORDER — CYCLOBENZAPRINE HCL 10 MG
10 TABLET ORAL 3 TIMES DAILY
Qty: 21 TABLET | Refills: 0 | Status: SHIPPED | OUTPATIENT
Start: 2024-11-12 | End: 2024-11-19

## 2024-11-12 RX ORDER — CEFAZOLIN SODIUM 2 G/50ML
2000 SOLUTION INTRAVENOUS ONCE
Status: COMPLETED | OUTPATIENT
Start: 2024-11-12 | End: 2024-11-12

## 2024-11-12 RX ORDER — OXYCODONE HYDROCHLORIDE 5 MG/1
5 TABLET ORAL EVERY 4 HOURS PRN
Status: CANCELLED | OUTPATIENT
Start: 2024-11-12

## 2024-11-12 RX ORDER — HYDROMORPHONE HCL/PF 1 MG/ML
0.5 SYRINGE (ML) INJECTION
Status: DISCONTINUED | OUTPATIENT
Start: 2024-11-12 | End: 2024-11-12 | Stop reason: HOSPADM

## 2024-11-12 RX ORDER — MIDAZOLAM HYDROCHLORIDE 2 MG/2ML
INJECTION, SOLUTION INTRAMUSCULAR; INTRAVENOUS AS NEEDED
Status: DISCONTINUED | OUTPATIENT
Start: 2024-11-12 | End: 2024-11-12

## 2024-11-12 RX ORDER — GABAPENTIN 300 MG/1
300 CAPSULE ORAL 3 TIMES DAILY
Qty: 21 CAPSULE | Refills: 0 | Status: SHIPPED | OUTPATIENT
Start: 2024-11-12 | End: 2024-11-19

## 2024-11-12 RX ORDER — LIDOCAINE HYDROCHLORIDE 10 MG/ML
INJECTION, SOLUTION EPIDURAL; INFILTRATION; INTRACAUDAL; PERINEURAL AS NEEDED
Status: DISCONTINUED | OUTPATIENT
Start: 2024-11-12 | End: 2024-11-12

## 2024-11-12 RX ORDER — SACCHAROMYCES BOULARDII 250 MG
250 CAPSULE ORAL 2 TIMES DAILY
COMMUNITY

## 2024-11-12 RX ORDER — ENOXAPARIN SODIUM 100 MG/ML
40 INJECTION SUBCUTANEOUS ONCE
Status: COMPLETED | OUTPATIENT
Start: 2024-11-12 | End: 2024-11-12

## 2024-11-12 RX ORDER — SODIUM CHLORIDE, SODIUM LACTATE, POTASSIUM CHLORIDE, CALCIUM CHLORIDE 600; 310; 30; 20 MG/100ML; MG/100ML; MG/100ML; MG/100ML
20 INJECTION, SOLUTION INTRAVENOUS CONTINUOUS
Status: CANCELLED | OUTPATIENT
Start: 2024-11-12

## 2024-11-12 RX ORDER — GABAPENTIN 300 MG/1
300 CAPSULE ORAL ONCE
Status: COMPLETED | OUTPATIENT
Start: 2024-11-12 | End: 2024-11-12

## 2024-11-12 RX ORDER — CEPHALEXIN 500 MG/1
500 CAPSULE ORAL 3 TIMES DAILY
Qty: 21 CAPSULE | Refills: 0 | Status: SHIPPED | OUTPATIENT
Start: 2024-11-12 | End: 2024-11-19

## 2024-11-12 RX ORDER — ENOXAPARIN SODIUM 100 MG/ML
40 INJECTION SUBCUTANEOUS DAILY
Qty: 1.2 ML | Refills: 0 | Status: SHIPPED | OUTPATIENT
Start: 2024-11-12 | End: 2024-11-15

## 2024-11-12 RX ORDER — ROCURONIUM BROMIDE 10 MG/ML
INJECTION, SOLUTION INTRAVENOUS AS NEEDED
Status: DISCONTINUED | OUTPATIENT
Start: 2024-11-12 | End: 2024-11-12

## 2024-11-12 RX ORDER — FLUTICASONE PROPIONATE 50 MCG
2 SPRAY, SUSPENSION (ML) NASAL DAILY
Status: DISCONTINUED | OUTPATIENT
Start: 2024-11-12 | End: 2024-11-12 | Stop reason: HOSPADM

## 2024-11-12 RX ORDER — ONDANSETRON 2 MG/ML
INJECTION INTRAMUSCULAR; INTRAVENOUS AS NEEDED
Status: DISCONTINUED | OUTPATIENT
Start: 2024-11-12 | End: 2024-11-12

## 2024-11-12 RX ORDER — FENTANYL CITRATE 50 UG/ML
INJECTION, SOLUTION INTRAMUSCULAR; INTRAVENOUS AS NEEDED
Status: DISCONTINUED | OUTPATIENT
Start: 2024-11-12 | End: 2024-11-12

## 2024-11-12 RX ORDER — PROMETHAZINE HYDROCHLORIDE 25 MG/ML
25 INJECTION, SOLUTION INTRAMUSCULAR; INTRAVENOUS ONCE AS NEEDED
Status: DISCONTINUED | OUTPATIENT
Start: 2024-11-12 | End: 2024-11-12 | Stop reason: HOSPADM

## 2024-11-12 RX ORDER — OXYCODONE HYDROCHLORIDE 5 MG/1
5 TABLET ORAL EVERY 6 HOURS PRN
Qty: 10 TABLET | Refills: 0 | Status: SHIPPED | OUTPATIENT
Start: 2024-11-12

## 2024-11-12 RX ADMIN — MIDAZOLAM HYDROCHLORIDE 2 MG: 1 INJECTION, SOLUTION INTRAMUSCULAR; INTRAVENOUS at 13:17

## 2024-11-12 RX ADMIN — CEFAZOLIN SODIUM 2000 MG: 2 SOLUTION INTRAVENOUS at 13:28

## 2024-11-12 RX ADMIN — ROCURONIUM BROMIDE 50 MG: 10 INJECTION, SOLUTION INTRAVENOUS at 13:25

## 2024-11-12 RX ADMIN — ROCURONIUM BROMIDE 50 MG: 10 INJECTION, SOLUTION INTRAVENOUS at 14:31

## 2024-11-12 RX ADMIN — SODIUM CHLORIDE, SODIUM LACTATE, POTASSIUM CHLORIDE, CALCIUM CHLORIDE: 600; 310; 30; 20 INJECTION, SOLUTION INTRAVENOUS at 13:29

## 2024-11-12 RX ADMIN — FENTANYL CITRATE 100 MCG: 50 INJECTION INTRAMUSCULAR; INTRAVENOUS at 14:17

## 2024-11-12 RX ADMIN — SODIUM CHLORIDE, SODIUM LACTATE, POTASSIUM CHLORIDE, AND CALCIUM CHLORIDE: .6; .31; .03; .02 INJECTION, SOLUTION INTRAVENOUS at 13:17

## 2024-11-12 RX ADMIN — FENTANYL CITRATE 50 MCG: 50 INJECTION INTRAMUSCULAR; INTRAVENOUS at 13:28

## 2024-11-12 RX ADMIN — HYDROMORPHONE HYDROCHLORIDE 0.5 MG: 1 INJECTION, SOLUTION INTRAMUSCULAR; INTRAVENOUS; SUBCUTANEOUS at 17:14

## 2024-11-12 RX ADMIN — DEXAMETHASONE SODIUM PHOSPHATE 10 MG: 10 INJECTION INTRAMUSCULAR; INTRAVENOUS at 13:35

## 2024-11-12 RX ADMIN — FENTANYL CITRATE 50 MCG: 50 INJECTION INTRAMUSCULAR; INTRAVENOUS at 13:24

## 2024-11-12 RX ADMIN — EPHEDRINE SULFATE 5 MG: 50 INJECTION, SOLUTION INTRAVENOUS at 14:04

## 2024-11-12 RX ADMIN — ACETAMINOPHEN 975 MG: 325 TABLET, FILM COATED ORAL at 11:31

## 2024-11-12 RX ADMIN — PROPOFOL 160 MG: 10 INJECTION, EMULSION INTRAVENOUS at 13:24

## 2024-11-12 RX ADMIN — ONDANSETRON 4 MG: 2 INJECTION INTRAMUSCULAR; INTRAVENOUS at 17:12

## 2024-11-12 RX ADMIN — LIDOCAINE HYDROCHLORIDE 50 MG: 10 INJECTION, SOLUTION EPIDURAL; INFILTRATION; INTRACAUDAL; PERINEURAL at 13:24

## 2024-11-12 RX ADMIN — HYDROMORPHONE HYDROCHLORIDE 1 MG: 1 INJECTION, SOLUTION INTRAMUSCULAR; INTRAVENOUS; SUBCUTANEOUS at 14:18

## 2024-11-12 RX ADMIN — SUGAMMADEX 133 MG: 100 INJECTION, SOLUTION INTRAVENOUS at 17:23

## 2024-11-12 RX ADMIN — PROPOFOL 40 MG: 10 INJECTION, EMULSION INTRAVENOUS at 13:27

## 2024-11-12 RX ADMIN — GABAPENTIN 300 MG: 300 CAPSULE ORAL at 11:31

## 2024-11-12 RX ADMIN — ONDANSETRON: 2 INJECTION INTRAMUSCULAR; INTRAVENOUS at 17:55

## 2024-11-12 RX ADMIN — Medication 400 MCG: at 14:24

## 2024-11-12 RX ADMIN — PHENYLEPHRINE HYDROCHLORIDE 20 MCG/MIN: 10 INJECTION INTRAVENOUS at 14:34

## 2024-11-12 RX ADMIN — SODIUM CHLORIDE, SODIUM LACTATE, POTASSIUM CHLORIDE, CALCIUM CHLORIDE: 600; 310; 30; 20 INJECTION, SOLUTION INTRAVENOUS at 15:55

## 2024-11-12 RX ADMIN — ENOXAPARIN SODIUM 40 MG: 40 INJECTION SUBCUTANEOUS at 11:32

## 2024-11-12 RX ADMIN — FLUTICASONE PROPIONATE 2 SPRAY: 50 SPRAY, METERED NASAL at 12:03

## 2024-11-12 NOTE — ANESTHESIA PREPROCEDURE EVALUATION
Procedure:  BRAZILLIAN BUTT LIFT (Buttocks)    Relevant Problems   ANESTHESIA (within normal limits)  Eye surgery as child, no issues      CARDIO (within normal limits)  Hx POTS, no current issues per patient other than soft BPs   (+) Migraine with aura and without status migrainosus, not intractable   (-) MI (myocardial infarction) (HCC)      ENDO (within normal limits)      GI/HEPATIC (within normal limits)  NPO confirmed  BMI 26.8      /RENAL (within normal limits)      NEURO/PSYCH   (+) Anxiety with depression   (+) Migraine with aura and without status migrainosus, not intractable   (-) CVA (cerebral vascular accident) (HCC)      PULMONARY  Mild nasal congestion secondary to skipping usual zyrtec dose   (+) Cough variant asthma (As child, no inhaler use >5 yrs, no hx hospitalizations)   (-) URI (upper respiratory infection)      Allergies   Allergen Reactions    Pollen Extract      Social History     Tobacco Use    Smoking status: Never     Passive exposure: Current    Smokeless tobacco: Never   Vaping Use    Vaping status: Never Used   Substance Use Topics    Alcohol use: Yes     Comment: social- less than 1 drink per week    Drug use: Yes     Frequency: 7.0 times per week     Types: Marijuana     Comment: social     Current Outpatient Medications   Medication Instructions    ascorbic acid (VITAMIN C) 250 mg, Oral, Daily    BIOTIN PO 1 capsule, Oral, Daily    cetirizine (ZYRTEC) 10 mg, Oral, Daily    saccharomyces boulardii (FLORASTOR) 250 mg, Oral, 2 times daily    VITAMIN D PO Oral     Lab Results   Component Value Date    WBC 10.15 11/11/2024    HGB 13.6 11/11/2024    HCT 40.9 11/11/2024     11/11/2024    SODIUM 137 11/11/2024    K 3.9 11/11/2024     11/11/2024    CO2 25 11/11/2024    BUN 16 11/11/2024    CREATININE 0.75 11/11/2024    GLUC 75 10/30/2024    HGBA1C 5.0 10/30/2024    AST 19 10/30/2024    ALT 20 10/30/2024    ALKPHOS 49 10/30/2024    TBILI 0.46 10/30/2024    ALB 4.4 10/30/2024     PROTIME 14.0 09/05/2017    PTT 29 09/05/2017    INR 1.06 09/05/2017     Vitals:    11/12/24 1125   BP: 116/59   Pulse: 96   Resp: 16   Temp: 97.9 °F (36.6 °C)   SpO2: 100%     EKG 4/12/23  Normal sinus rhythm with sinus arrhythmia  Normal ECG  When compared with ECG of 05-DEC-2022 19:09,  T wave inversion no longer evident in Inferior leads  Nonspecific T wave abnormality, improved in Anterolateral leads  Confirmed by Stella Laboy ([id number]) on 4/13/2023 9:15:26 AM    Echo 12/7/22   Left Ventricle: Left ventricular cavity size is normal. Wall thickness is normal. The left ventricular ejection fraction is 60%. Systolic function is normal. Wall motion is normal. Diastolic function is normal.     Stress EKG 12/1/22  Resting ECG  ECG is normal. The ECG shows normal sinus rhythm.    Stress Findings  A Baron protocol stress test was performed. Overall, the patient's exercise capacity was normal for their age. The patient reached stage 3.0 of the protocol after exercising for 9 min and had a maximal HR of 190 bpm (94 % of MPHR) and 10.1 METS. The patient experienced no angina during the test. The patient achieved the target heart rate and achieved their maximal exercise threshold. The test was stopped because of LIGHTHEADEDNESS. The patient reported BREATHING HEAVY, LIGHTHEADEDNESS during the stress test. Symptoms ended during recovery. Blood pressure demonstrated a normal response and heart rate demonstrated an initial exaggerated response to stress (80 bpm to 120 bpm within 1 min of Baron protocol).    Stress ECG  There were no arrhythmias during stress. The stress ECG is negative for ischemia after maximal exercise, without reproduction of symptoms.    Physical Exam    Airway    Mallampati score: I  TM Distance: >3 FB  Neck ROM: full     Dental   Comment: Denies loose/chipped teeth, No notable dental hx     Cardiovascular  Rhythm: regular, Rate: normal, Cardiovascular exam normal    Pulmonary  Pulmonary exam normal  Breath sounds clear to auscultation    Other Findings  post-pubertal.      Anesthesia Plan  ASA Score- 2     Anesthesia Type- general with ASA Monitors.         Additional Monitors:     Airway Plan: ETT.           Plan Factors-Exercise tolerance (METS): >4 METS.    Chart reviewed. EKG reviewed.  Existing labs reviewed. Patient summary reviewed.    Patient is a current smoker.  Patient instructed to abstain from smoking on day of procedure. Patient did not smoke on day of surgery (marijuana 2 days ago).            Induction- intravenous.    Postoperative Plan- Plan for postoperative opioid use.     Perioperative Resuscitation Plan - Level 1 - Full Code.       Informed Consent- Anesthetic plan and risks discussed with patient and mother.  I personally reviewed this patient with the CRNA. Discussed and agreed on the Anesthesia Plan with the CRNA..

## 2024-11-12 NOTE — H&P
Assessment & Plan   19-year-old female for liposuction and fat grafting to the bilateral buttocks and hip  1.)  Reviewed the risks, benefits, alternatives of fat grafting, namely the risk of under correction  2.)  We identified her donor sites once again  3.)  All her questions answered to her satisfaction  4.)  Informed consent obtained and signed, will proceed to the OR as scheduled.          Discussion--I had a lengthy discussion with the patient regarding her options, I do feel that the patient would be an excellent candidate for fat grafting to the hip and buttock region.  We discussed donor site, I did discuss with her that donor site availability of her fat is her limiting issue as she would not have enough donor fat for a traditional Brazilian type butt lift  Which requires several liters of fat.  However using a combination of donor sites, particularly her lower flank and grafting to her hip depression would significantly improve her shape and contour with a mild to moderate improvement in her overall buttock volume.  I discussed the nature of fat grafting, the risks, benefits, alternatives including the risk of bleeding, infection, scarring, under correction, overcorrection, fat necrosis, fat emboli, need for revision, fat resorption, need for multiple procedures, asymmetry, poor aesthetic result, we discussed the expected hospital stay, the expected recovery time, all her questions were answered to her satisfaction, overall patient is an excellent candidate although I did discuss with her that it may be difficult to completely correct the area.  Patient understands and agrees, all her questions answered to her satisfaction.  Informed consent obtained and signed.  Will proceed to the OR as scheduled.            Subjective   Patient ID: Cristina Pemberton is a 19 y.o. female.    Vitals:    11/06/24 1434   BP: 110/64   Pulse: 71   Resp: 18   Temp: 97.9 °F (36.6 °C)   SpO2: 99%     HPI  Patient is a very pleasant  19-year-old female who is here today with her mother to discuss upcoming body contouring with a Brazilian butt lift.  Patient has no significant medical conditions, she is a non-smoker.  Her mother has some autoimmune related disease, however she has been tested and does not have  genetic markers for autoimmune disease or blood clot.  She is here today as she is unhappy with the shape and contour mainly of her lateral hip region.  The patient is extremely committed to a workout routine, she finds that despite her conditioning she finds this area difficult to achieve the contour that she wants.  We are planning for fat grafting to her buttock and hip region.  She is completing a course of antibiotics for chlamydia infection.      The following portions of the patient's history were reviewed and updated as appropriate: allergies, current medications, past family history, past medical history, past social history, past surgical history, and problem list.    Review of Systems   All other systems reviewed and are negative.    Objective   Physical Exam   Constitutional  She appears well-developed and well-nourished.     Eyes  Pupils are equal, round, and reactive to light. System normal.     General -             Right: Right eye extraocular movements are normal.             Left: Left eye extraocular movements are normal.     Cardiovascular: Normal rate and regular rhythm.     Pulmonary/Chest  Effort normal and breath sounds normal.     Skin    Physical exam--patient with very mild to moderate localized adiposity in the forearms, abdomen and lateral flank, on the lower hip the patient does have a classic hip dip type contour deformity.  She overall has excellent shape and aesthetic contour to her buttock.       Psychiatric  She has a normal mood and affect. Her behavior is normal. Judgment and thought content normal.       Past Medical History:   Diagnosis Date    Acute pharyngitis due to other specified organisms  10/28/2018    Last Assessment & Plan:  Rapid Strep Negative-likely viral Treat with OTC cold medications and analgesics Throat Culture sent to lab Parents advised they will contacted with results within 48 hours if Positive    Anxiety     Anxiety with depression 01/30/2024    Asthma     Depression     Hamstring injury, left, initial encounter 05/31/2018    Headache(784.0)     Heart murmur     PANDAS (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)  ([facility])     SIRS (systemic inflammatory response syndrome) ([facility]) 12/05/2022     Past Surgical History:   Procedure Laterality Date    EYE SURGERY Left     muscle suregry     Current Outpatient Medications   Medication Instructions    ascorbic acid (VITAMIN C) 250 mg, Oral, Daily    BIOTIN PO 1 capsule, Oral, Daily    cetirizine (ZYRTEC) 10 mg, Oral, Daily    VITAMIN D PO Oral       Social History     Social History Narrative    Not on file     Social History     Tobacco Use   Smoking Status Never    Passive exposure: Current   Smokeless Tobacco Never

## 2024-11-12 NOTE — INTERIM OP NOTE
BRAZILLIAN BUTT LIFT  Postoperative Note  PATIENT NAME: Cristina Pemberton  : 2005  MRN: [id number]  [facility] OR ROOM 04    Surgery Date: 2024    Preop Diagnosis:  Encounter for cosmetic surgery [Z41.1]    Post-Op Diagnosis Codes:     * Encounter for cosmetic surgery [Z41.1]    Procedure(s) (LRB):  BRAZILLIAN BUTT LIFT (N/A) fat grafting to bilateral hips and buttock 200mL bilaterally, 400mL total    Surgeons and Role:     * David Jenkins MD - Primary     * Pushpa Ferguson PA-C - Assisting    Specimens:  * No specimens in log *    Estimated Blood Loss:   Minimal    Anesthesia Type:   General     Findings:    Dictated      Complications:   None      SIGNATURE: David Jenkins MD   DATE: 2024   TIME: 5:26 PM

## 2024-11-12 NOTE — DISCHARGE INSTR - AVS FIRST PAGE
Plastic Surgery Fat Grafting Discharge Instructions:     - Pressure offload your buttocks as much as possible for 2 weeks, lay on your side to avoid sitting on your buttocks.  - Fat grafting sites on buttocks and hips will be covered with surgical glue, this will come off on its own in about two weeks. Do not remove the glue.   - Wear compression garment at all times.   - It is normal for the buttocks and hips to be bruised, red, and itchy following this procedure.   - You may shower in 24 hours.  - Liposuction sites will be sutured, these sutures will be removed in about two weeks. Red, watery drainage from the liposuction sites is completely normal and expected.   - Swelling and bruising after liposuction is also completely normal and expected, elevate your legs/arms as needed.   - No strenuous activity or pushing, pulling, lifting >10 lbs x 4 weeks.         All Surgeries     You must be off all pain medications and have a clear field of vision before driving  For the next 24 hours:  Do not sign any legal documents or operate machinery.  Have a responsible adult help you.  Take it easy & rest.  Clear liquids first, if no nausea, progress to soft diet, and then regular diet as tolerated.  Take medications as ordered, and do not take any pain medication on an empty stomach. Once pain meds are finished you may alternate Tylenol & Advil as directed for pain  Make sure to take all antibiotics until completion  If lovenox or heparin was prescribed, use as directed until completion  Avoid alcoholic beverages.  Resume any prior medications at home unless otherwise instructed by Dr. Jenkins.  Call Dr. Jenkins at [phone] -office,  Obvious bleeding, excessive swelling, and tenderness  Hardness of face on palpation  Swelling & hardness at eyelids.  Fever over 101.0°   Shortness of breath, severe calf or thigh pain.  Redness, odor, or pus at the wound {some oozing is normal from incision sites and may continue for  several days ABD pads or feminine pads can be used for absorption )  Persistent vomiting or pain that is not relieved by your medication.  To make your follow-up appointment , ask a question, or report a problem

## 2024-11-12 NOTE — PROGRESS NOTES
Progress Note - Plastic Surgery   Name: Cristina Pemberton 19 y.o. female I MRN: [id number]  Unit/Bed#: OR POOL I Date of Admission: 11/12/2024   Date of Service: 11/12/2024 I Hospital Day: 0     Assessment & Plan      Patient marked in pre-operative holding area.  Reviewed markings with patient and the planned injection/augmentation.  We identified her arms, abdomen flanks and lower back as donor sites.  All her questions were answered to her satisfaction, will proceed to OR as scheduled.

## 2024-11-13 ENCOUNTER — TELEPHONE (OUTPATIENT)
Age: 19
End: 2024-11-13

## 2024-11-13 NOTE — TELEPHONE ENCOUNTER
Called patient and spoke with patient's mother, she stated that they did not feel they got good instructions for aftercare, stated she felt they were rushed out of the hospital. She asked questions:     When could the patient shower?     Is she is to change the bandages?     Is she is to wear the binder?     What medications to take?     Was there Lipo to her back?     She asked if she could be scheduled in office to be evaluated. Scheduled a visit with Pushpa Ferguson Pa-C. She was informed that Dr. Jenkins would not be in office.  Informed her that I would reach out to Dr. Jenkins and Pushpa Ferguson PA-C and return her call regarding her questions.

## 2024-11-13 NOTE — TELEPHONE ENCOUNTER
S/W Pushpa Ferguson PA-C, she infromed me:     When could the patient shower? Yes, patient is able to shower as long as she places her compression back on, arms, abdomen, ect.     Is she is to change the bandages? Yes, to shower and then place bandages back on.      Is she is to wear the binder? Yes.    What medications to take? Take the Keflex, Tylenol, Gabapentin, Flexeril, every 8 hours scheduled. Take the Oxycodone as needed for severe pain every 6 hours. Inject the Lovenox daily.     Was there Lipo to her back?  Dr. Jenkins liposuctioned patient's lower back, flanks, sides of abdomen, abdomen, and upper arms. The bulk of fat was injected into the hip dips and the residual fat into buttock.       Called and informed patient and her mother of above information and she stated that they would like to keep appointment to be evaluated.

## 2024-11-13 NOTE — TELEPHONE ENCOUNTER
"Rec'd call from patient's mom stating that she is confused about aftercare as she had rec'd a couple different conflicting instructions. She is also concerned because she sees no difference and states \"it does not even look like patient had surgery\" Mom requested to speak to Cynthia WAGNER. Communicated with Cynthia via Teams. She was rooming a patient but advised she will call mom back asap. Explained this to mom. Mom verbalized understanding and will wait for return call.  "

## 2024-11-14 ENCOUNTER — OFFICE VISIT (OUTPATIENT)
Age: 19
End: 2024-11-14

## 2024-11-14 VITALS — TEMPERATURE: 97.8 F | HEART RATE: 79 BPM | OXYGEN SATURATION: 99 %

## 2024-11-14 DIAGNOSIS — Z48.89 ENCOUNTER FOR FOLLOW-UP CARE INVOLVING PLASTIC SURGERY: Primary | ICD-10-CM

## 2024-11-14 PROCEDURE — RECHECK: Performed by: PHYSICIAN ASSISTANT

## 2024-11-14 NOTE — PROGRESS NOTES
"Plastic Surgery Follow Up Note:     HPI: The patient is a 19-year-old female presenting with her mother today for a postoperative follow-up status post [facility] BUTT LIFT, fat grafting to bilateral hips and buttock 200mL bilaterally, 400mL total on 11/12/2024.  The procedure went as planned without any complications.  The patient and her mother requested appointment today as they are displeased with surgical results.  They state they were expecting more drastic results in the patient's hips.  The mother also voices concerns with how the patient was treated at the hospital.  She also states that the pharmacist advised them not to take the prescribed medications altogether because she would \"overdose,\" and they would like clarification on how to take postoperative medications.    Past Medical History:   Diagnosis Date    Acute pharyngitis due to other specified organisms 10/28/2018    Last Assessment & Plan:  Rapid Strep Negative-likely viral Treat with OTC cold medications and analgesics Throat Culture sent to lab Parents advised they will contacted with results within 48 hours if Positive    Anxiety     Anxiety with depression 01/30/2024    Asthma     Depression     Hamstring injury, left, initial encounter 05/31/2018    Headache(784.0)     Heart murmur     PANDAS (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)  ([facility])     SIRS (systemic inflammatory response syndrome) ([facility]) 12/05/2022       Patient Active Problem List   Diagnosis    Neuropathic pain, leg, bilateral    History of PANDAS    Neurologic gait dysfunction    Cough variant asthma    Environmental and seasonal allergies    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    Migraine with aura and without status migrainosus, not intractable    Hand, foot and mouth disease    Anxiety with depression         Current Outpatient Medications:     acetaminophen (TYLENOL) 500 mg tablet, Take 2 tablets (1,000 mg total) by mouth " every 6 (six) hours for 7 days, Disp: 56 tablet, Rfl: 0    ascorbic acid (VITAMIN C) 250 mg tablet, Take 250 mg by mouth daily, Disp: , Rfl:     BIOTIN PO, Take 1 capsule by mouth daily, Disp: , Rfl:     cephalexin (KEFLEX) 500 mg capsule, Take 1 capsule (500 mg total) by mouth 3 (three) times a day for 7 days, Disp: 21 capsule, Rfl: 0    cetirizine (ZyrTEC) 10 mg tablet, Take 10 mg by mouth daily, Disp: , Rfl: 5    cyclobenzaprine (FLEXERIL) 10 mg tablet, Take 1 tablet (10 mg total) by mouth 3 (three) times a day for 7 days, Disp: 21 tablet, Rfl: 0    enoxaparin (Lovenox) 40 mg/0.4 mL, Inject 0.4 mL (40 mg total) under the skin in the morning for 3 days, Disp: 1.2 mL, Rfl: 0    gabapentin (Neurontin) 300 mg capsule, Take 1 capsule (300 mg total) by mouth 3 (three) times a day for 7 days, Disp: 21 capsule, Rfl: 0    oxyCODONE (Roxicodone) 5 immediate release tablet, Take 1 tablet (5 mg total) by mouth every 6 (six) hours as needed for severe pain Max Daily Amount: 20 mg, Disp: 10 tablet, Rfl: 0    saccharomyces boulardii (FLORASTOR) 250 mg capsule, Take 250 mg by mouth 2 (two) times a day, Disp: , Rfl:     VITAMIN D PO, Take by mouth, Disp: , Rfl:     Past Surgical History:   Procedure Laterality Date    EYE SURGERY Left     muscle suregry    [place] GRAFTING OF AUTOLOGOUS FAT BY LIPO 50 CC OR LESS N/A 11/12/2024    Procedure: BRAZILLIAN BUTT LIFT;  Surgeon: David Jenkins MD;  Location: [facility];  Service: Plastics       Social History     Tobacco Use    Smoking status: Never     Passive exposure: Current    Smokeless tobacco: Never   Substance Use Topics    Alcohol use: Yes     Comment: social- less than 1 drink per week       Objective:  Vitals:    11/14/24 1012   Pulse: 79   Temp: 97.8 °F (36.6 °C)   SpO2: 99%       Physical Exam:  General: NAD, alert, cooperative    Skin: Liposuction sites of abdomen, hips, flanks, and bilateral upper extremities are clean, dry, and intact.  Fat grafting sites of hips and  buttocks are also clean, dry, and intact.  Expected amount of postoperative edema and ecchymosis within surgical sites, within normal limits.  There is no evidence of abnormal bruising, drainage, bleeding, or any evidence of infection.  Improved contour of bilateral hips and buttocks.    Assessment: 19-year-old female status post [place] butt lift    Plan:  Today I advised the patient that overall she is well-healing and although I do not see any abnormalities or poor cosmetic result, I will convey the patient and her mother's concerns to Dr. Jenkins and arrange time for him to speak with them regarding their concerns with the surgical outcome. Advised them that swelling takes 3-6 months to resolve, and we would have to wait several months before performing any revision, if warranted.     I apologized for their poor experience at the hospital as they stated they felt very rushed at the end of the day and the nurses did not provide adequate care to the patient or go over instructions and postoperative care.    I reviewed the medication regimen-I advised the patient that she may take gabapentin, Flexeril, and Tylenol around-the-clock, and add in oxycodone as needed every 6 hours for breakthrough pain.  I advised patient and her mother that this is our routine postoperative pain regimen and these medications are safe to take.  I advised the patient that she may shower, however, she will need to reapply compression garments to her abdomen, hips, and bilateral upper extremities.      Pushpa Cabrera PA-C  Plastic & Reconstructive Surgery

## 2024-11-15 NOTE — OP NOTE
"OPERATIVE REPORT  PATIENT NAME: Cristina Pemberton    :  2005  MRN: [id number]  Pt Location: [facility] OR ROOM 04    SURGERY DATE: 2024    Surgeons and Role:     * David Jenkins MD - Primary     * Pushpa Ferguson PA-C - Assisting, no qualified resident available    Preop Diagnosis:  Bilateral contour deformity of the bilateral hips, relative under projection of the bilateral buttock      Postoperative diagnosis as above    Procedure(s):  Anesthesia Type:   General          Procedure:  Bilateral fat grafting to the bilateral hips and buttock, 200 mL bilaterally, 400 mL total, with fat harvest donor sites of the bilateral arms, lower flank, abdomen, and lower back    Specimen(s):  * No specimens in log *    Estimated Blood Loss:   Minimal    Drains:  [REMOVED] Urethral Catheter Straight-tip 16 Fr. (Removed)   Number of days: 0       Anesthesia Type:   General    Operative Indications:  Encounter for cosmetic surgery [Z41.1]  As above    Operative Findings:  Dictated      Complications:   None    Procedure and Technique:  The patient is a 19-year-old female known to me for previous consultation for fat grafting to the bilateral hips and buttock, the patient is very body conscious, and is adhering to a strict workout routine, however she feels that she has been unable to modify the bilateral hip region, the patient has a classic \"hip dip \"deformity for which she would like corrected as well as possible buttock augmentation.  I do lengthy discussion with the patient regarding her options, I did discuss with the patient that given her BMI, her overall fitness level, she did not have a large amount of donor sites fat which may be a limiting option regarding the amount of correction and augmentation the patient is able to undertake.  I discussed with the patient she is not necessarily candidate for what is known as a Brazilian butt lift, which typically requires several liters of fat to be injected into the " buttock area, however she would be a good candidate for correction of the deformity, the patient stated that she was not interested in a dramatic result but felt that she would like to have improvement mainly in the bilateral hips and buttock region if possible.  We had identified her donor site, I discussed with the patient in order to have a significant enough amount of fat for injection we would likely need to identify several donor sites.  The patient identified her bilateral arms, abdomen, and lower flanks as well as her lower back, I discussed with the patient that the patient did have a moderate adiposity of her flanks, I discussed with her that liposuction to this area as well as the lower back would help both also alleviate the hip deformity, I discussed with patient risks, benefits, alternatives of fat grafting including the risk of bleeding, infection, scarring, poor wound healing, damage to surrounding and underlying structures, need for further surgery, need for multiple procedures, need for donor site, under correction, overcorrection, fat necrosis, fat resorption, the relative high risk of further revision and need for further fat grafting, contour deformity, high risk of under correction, risk of overcorrection, seroma, fat embolism, DVT, all her questions answered to her satisfaction, informed consent obtained and signed, and the patient was brought to the hospital as an outpatient on elective basis to have the procedure performed.    The patient was first given preoperative antibiotics in the preoperative holding area, her SCDs were activated she given Lovenox prior to induction of anesthesia.  We identified her donor sites once again at the bilateral arms, abdomen, lower flanks, and lower back, she was then brought to the OR where she is identified verbally, by site, and by armband in the operating table prior to induction of anesthesia.  After the induction of anesthesia she was prepped and  draped in the standard surgical fashion, timeout was performed and the surgical site identified.    At this time attention was first turned to the bilateral arms, flanks, and abdomen where a standard tumescent solution was injected into each area.  Once this was performed, after sufficient time had passed for the onset of anesthesia, using the Lipo graft or fat harvesting system, fat was harvested with a 4 mm cannula in the abdomen, flanks, and arms.  This was done using multiple planes multiple passes taking care to ensure resection of the deeper fat layers to prevent a contour deformity.  Approximately 150 mL was harvested from the bilateral abdomen for 300 mL total, an additional 200 from each flank and approximately 100 mL from each forearm.  The endpoints were the pinch test, visual inspection, and return of bloody aspirate this resulted in approximately 900 cc of Lipo aspirate, once this was allowed to decant and resulted in approximately 400 cc of injectable fat.  Once the Lipo aspirate was allowed to decant, fat grafting started using a 1.4 mm cannula and creating the port sites using a 14-gauge Angiocath.  The liposuction sites were closed with 3-0 Monocryl, and the fat grafting proceeded with multiple planes and multiple passes using small aliquots of fat, approximately 100 mL was injected into the right anterior hip resulting in excellent correction of the hip deformity and enhancement of the lower hip curvature and shape.  The exact same fashion procedures performed on the right side.  The port sites were closed with glue, at this point the patient was then placed into the prone position, after ensuring that all her pressure points were adequately padded, the patient was once again prepped and draped in the standard surgical fashion, timeout performed surgical site once again identified.  A standard tumescent solution was then injected into the lower back, using the previous port sites on the patient's  left and creating a new port site on the right.  While the tumescent was taking effect,, fat grafting proceeded once again in the bilateral hips and posterior region injecting approximately another 50 mL of fat into the posterior hip.  Once this was performed, the the skin began to exhibit some signs of increased turgor pressure, because the correction was very good, the decision was made to complete fat grafting of the bilateral hips to decrease the risk of overcorrection and fat necrosis.  In similar fashion, liposuction was performed to the bilateral lower back, resulting in an additional 100 mL of Lipo aspirate, once this was allowed to decant, fat grafting was performed in the buttock proper, particularly the lower buttock, where the patient had a very mild deformity bilaterally.  Once again using an Angiocath, port sites were created, and an additional 50 cc of fat were injected into the lower portion of the buttock in order to blend the most projected part, care was taken to maintain a very superficial plane to decrease the risk of fat embolus.  The patient had excellent correction of deformity and enhancement of her buttock region, 400 mL total had been injected, the patient tolerated the procedure well, there were no complications.  The port sites were once again closed, she was flipped into the supine position and extubated and in stable condition sent to the recovery room, we will monitor her status and she will likely be discharged today.  Pushpa Cabrera, DEANN assistance was necessary as no qualified resident was available, assistant was necessary for flap elevation, retraction, exposure given the patient's body habitus and wound closure.               I was present for the entire procedure.    Patient Disposition:  PACU              SIGNATURE: David Jenkins MD  DATE: November 15, 2024  TIME: 10:48 AM

## 2024-11-18 ENCOUNTER — COSMETIC (OUTPATIENT)
Age: 19
End: 2024-11-18

## 2024-11-18 VITALS
SYSTOLIC BLOOD PRESSURE: 110 MMHG | WEIGHT: 146 LBS | HEIGHT: 62 IN | OXYGEN SATURATION: 99 % | BODY MASS INDEX: 26.87 KG/M2 | HEART RATE: 97 BPM | TEMPERATURE: 97.6 F | DIASTOLIC BLOOD PRESSURE: 68 MMHG

## 2024-11-18 DIAGNOSIS — Z48.89 ENCOUNTER FOR FOLLOW-UP CARE INVOLVING PLASTIC SURGERY: Primary | ICD-10-CM

## 2024-11-18 PROCEDURE — RECHECK: Performed by: PHYSICIAN ASSISTANT

## 2024-11-18 NOTE — PROGRESS NOTES
Plastic Surgery Follow Up Note:     HPI: The patient is a 19-year-old female presenting with her mother today for a postoperative follow-up status post [facility] BUTT LIFT, fat grafting to bilateral hips and buttock 200mL bilaterally, 400mL total on 11/12/2024.  The procedure went as planned without any complications.  The patient presents with her mother today.  She reports that overall she is doing well and denies any pain.  She does not need any pain medication refills at this time.  She has been resting and wearing compression garments around-the-clock.  Offers no complaints today.    Past Medical History:   Diagnosis Date    Acute pharyngitis due to other specified organisms 10/28/2018    Last Assessment & Plan:  Rapid Strep Negative-likely viral Treat with OTC cold medications and analgesics Throat Culture sent to lab Parents advised they will contacted with results within 48 hours if Positive    Anxiety     Anxiety with depression 01/30/2024    Asthma     Depression     Hamstring injury, left, initial encounter 05/31/2018    Headache(784.0)     Heart murmur     PANDAS (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)  ([facility])     SIRS (systemic inflammatory response syndrome) ([facility]) 12/05/2022       Patient Active Problem List   Diagnosis    Neuropathic pain, leg, bilateral    History of PANDAS    Neurologic gait dysfunction    Cough variant asthma    Environmental and seasonal allergies    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    Migraine with aura and without status migrainosus, not intractable    Hand, foot and mouth disease    Anxiety with depression         Current Outpatient Medications:     acetaminophen (TYLENOL) 500 mg tablet, Take 2 tablets (1,000 mg total) by mouth every 6 (six) hours for 7 days (Patient taking differently: Take 1,000 mg by mouth as needed for mild pain), Disp: 56 tablet, Rfl: 0    cephalexin (KEFLEX) 500 mg capsule, Take 1 capsule (500 mg  total) by mouth 3 (three) times a day for 7 days, Disp: 21 capsule, Rfl: 0    cyclobenzaprine (FLEXERIL) 10 mg tablet, Take 1 tablet (10 mg total) by mouth 3 (three) times a day for 7 days, Disp: 21 tablet, Rfl: 0    gabapentin (Neurontin) 300 mg capsule, Take 1 capsule (300 mg total) by mouth 3 (three) times a day for 7 days, Disp: 21 capsule, Rfl: 0    oxyCODONE (Roxicodone) 5 immediate release tablet, Take 1 tablet (5 mg total) by mouth every 6 (six) hours as needed for severe pain Max Daily Amount: 20 mg, Disp: 10 tablet, Rfl: 0    ascorbic acid (VITAMIN C) 250 mg tablet, Take 250 mg by mouth daily (Patient not taking: Reported on 11/18/2024), Disp: , Rfl:     BIOTIN PO, Take 1 capsule by mouth daily (Patient not taking: Reported on 11/18/2024), Disp: , Rfl:     cetirizine (ZyrTEC) 10 mg tablet, Take 10 mg by mouth daily (Patient not taking: Reported on 11/18/2024), Disp: , Rfl: 5    enoxaparin (Lovenox) 40 mg/0.4 mL, Inject 0.4 mL (40 mg total) under the skin in the morning for 3 days, Disp: 1.2 mL, Rfl: 0    saccharomyces boulardii (FLORASTOR) 250 mg capsule, Take 250 mg by mouth 2 (two) times a day (Patient not taking: Reported on 11/18/2024), Disp: , Rfl:     VITAMIN D PO, Take by mouth (Patient not taking: Reported on 11/18/2024), Disp: , Rfl:     Past Surgical History:   Procedure Laterality Date    EYE SURGERY Left     muscle suregry    [place] GRAFTING OF AUTOLOGOUS FAT BY LIPO 50 CC OR LESS N/A 11/12/2024    Procedure: BRAZILLIAN BUTT LIFT;  Surgeon: David Jenkins MD;  Location: [facility];  Service: Plastics       Social History     Tobacco Use    Smoking status: Never     Passive exposure: Current    Smokeless tobacco: Never   Substance Use Topics    Alcohol use: Yes     Comment: social- less than 1 drink per week       Objective:  Vitals:    11/18/24 0900   BP: 110/68   Pulse: 97   Temp: 97.6 °F (36.4 °C)   SpO2: 99%       Physical Exam:  General: NAD, alert, cooperative    Skin: Liposuction sites  of abdomen, hips, flanks, and bilateral upper extremities are clean, dry, and intact.  Fat grafting sites of hips and buttocks are also clean, dry, and intact.  Expected amount of postoperative edema and ecchymosis within surgical sites, within normal limits.  There is no evidence of abnormal bruising, drainage, bleeding, or any evidence of infection.  Improved contour of bilateral hips and buttocks.    Assessment: 19-year-old female status post [place] butt lift    Plan:  Patient continues to heal well from surgery.  She overall has a very good result.  RTO in 1 week, we will plan for visit with Dr. Jenkins to address their concerns.  Continue resting and compression garments around-the-clock.      Pushpa Cabrera PA-C  Plastic & Reconstructive Surgery

## 2024-11-22 ENCOUNTER — TELEPHONE (OUTPATIENT)
Age: 19
End: 2024-11-22

## 2024-11-22 NOTE — TELEPHONE ENCOUNTER
LVM to confirm appt for 11/25 in our [place] office. Advised pt to callback to r/s or cancel this appt if needed.

## 2024-11-25 ENCOUNTER — OFFICE VISIT (OUTPATIENT)
Age: 19
End: 2024-11-25

## 2024-11-25 DIAGNOSIS — Z41.1 ENCOUNTER FOR COSMETIC SURGERY: Primary | ICD-10-CM

## 2024-11-25 PROCEDURE — 99024 POSTOP FOLLOW-UP VISIT: CPT | Performed by: PLASTIC SURGERY

## 2024-11-25 NOTE — PROGRESS NOTES
Patient is a 19-year-old female who is nearly 2 weeks status post fat grafting to the bilateral hip and buttock.  Patient is overall doing well, she states that she has minimal pain, minimal drainage, she is slowly returning to normal activity, and is happy with the contour to the donor site areas.  She has concern that there is still slight depression on the right hip, she feels the left hip has a smoother transition but is overall healing well.    Physical exam--overall patient with excellent shape and contour, she is very good correction of her bilateral hip depression and projection to her buttock.  The right side does have a very mild depression, however her she does have some bruising and swelling on the right flank more so than the left.    Discussion--I discussed with the patient overall feeling that she is healing very well, she overall has excellent shape and contour, she does have a very mild depression on the right when compared to left, however I do feel that this is exaggerated by the fact that her right flank does have a significantly larger amount of bruising and swelling and may be over accentuating the appearance of this depression.  I discussed with her that I do feel that with continued time, particularly as she is only 2 weeks out from surgery and still has a significant mount of bruising and swelling this will continue to improve and her results will evolve.  Overall I do feel that she looks very good, however if she would need a small revision, this can be done however I would recommend waiting at least 3 to 6 months prior to considering any revision, patient understands and agrees.  She can otherwise slowly return to normal activity, I discussed with her that she should continue to wear her compression, as her activity level increases, she may experience more bruising and swelling however this is somewhat normal.  Otherwise the patient is doing well and can follow-up in approximately 2  weeks.

## 2024-12-12 ENCOUNTER — COSMETIC (OUTPATIENT)
Age: 19
End: 2024-12-12

## 2024-12-12 DIAGNOSIS — Z48.89 ENCOUNTER FOR FOLLOW-UP CARE INVOLVING PLASTIC SURGERY: Primary | ICD-10-CM

## 2024-12-12 PROCEDURE — RECHECK: Performed by: PHYSICIAN ASSISTANT

## 2024-12-12 NOTE — PROGRESS NOTES
Plastic Surgery Follow Up Note:     HPI: The patient is a 19-year-old female presenting today for a postoperative follow-up status post [facility] BUTT LIFT, fat grafting to bilateral hips and buttock 200mL bilaterally, 400mL total on 11/12/2024.  The procedure went as planned without any complications.  She reports that overall she is doing well and denies any pain, redness, drainage. She has been wearing compression garments around-the-clock.  She has been applying ointment and Vit E to her  scars. Offers no complaints today.    Past Medical History:   Diagnosis Date    Acute pharyngitis due to other specified organisms 10/28/2018    Last Assessment & Plan:  Rapid Strep Negative-likely viral Treat with OTC cold medications and analgesics Throat Culture sent to lab Parents advised they will contacted with results within 48 hours if Positive    Anxiety     Anxiety with depression 01/30/2024    Asthma     Depression     Hamstring injury, left, initial encounter 05/31/2018    Headache(784.0)     Heart murmur     PANDAS (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)  ([facility])     SIRS (systemic inflammatory response syndrome) ([facility]) 12/05/2022       Patient Active Problem List   Diagnosis    Neuropathic pain, leg, bilateral    History of PANDAS    Neurologic gait dysfunction    Cough variant asthma    Environmental and seasonal allergies    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    Migraine with aura and without status migrainosus, not intractable    Hand, foot and mouth disease    Anxiety with depression         Current Outpatient Medications:     ascorbic acid (VITAMIN C) 250 mg tablet, Take 250 mg by mouth daily, Disp: , Rfl:     BIOTIN PO, Take 1 capsule by mouth daily, Disp: , Rfl:     cetirizine (ZyrTEC) 10 mg tablet, Take 10 mg by mouth daily (Patient not taking: Reported on 11/18/2024), Disp: , Rfl: 5    cyclobenzaprine (FLEXERIL) 10 mg tablet, Take 1 tablet (10 mg total)  by mouth 3 (three) times a day for 7 days, Disp: 21 tablet, Rfl: 0    enoxaparin (Lovenox) 40 mg/0.4 mL, Inject 0.4 mL (40 mg total) under the skin in the morning for 3 days, Disp: 1.2 mL, Rfl: 0    gabapentin (Neurontin) 300 mg capsule, Take 1 capsule (300 mg total) by mouth 3 (three) times a day for 7 days, Disp: 21 capsule, Rfl: 0    oxyCODONE (Roxicodone) 5 immediate release tablet, Take 1 tablet (5 mg total) by mouth every 6 (six) hours as needed for severe pain Max Daily Amount: 20 mg (Patient not taking: Reported on 11/25/2024), Disp: 10 tablet, Rfl: 0    saccharomyces boulardii (FLORASTOR) 250 mg capsule, Take 250 mg by mouth 2 (two) times a day, Disp: , Rfl:     VITAMIN D PO, Take by mouth, Disp: , Rfl:     Past Surgical History:   Procedure Laterality Date    EYE SURGERY Left     muscle suregry    AL GRAFTING OF AUTOLOGOUS FAT BY LIPO 50 CC OR LESS N/A 11/12/2024    Procedure: BRAZILAN BUTT LIFT;  Surgeon: David Jenkins MD;  Location: [facility] OR;  Service: Plastics       Social History     Tobacco Use    Smoking status: Never     Passive exposure: Current    Smokeless tobacco: Never   Substance Use Topics    Alcohol use: Yes     Comment: social- less than 1 drink per week       Objective:  There were no vitals filed for this visit.      Physical Exam:  General: NAD, alert, cooperative    Skin: Liposuction sites of abdomen, hips, flanks, and bilateral upper extremities are clean, dry, and intact.  Fat grafting sites of hips and buttocks are also clean, dry, and intact.  Expected amount of postoperative edema, within normal limits.  There is no evidence of abnormal bruising, drainage, bleeding, or any evidence of infection.  Improved contour of bilateral hips and buttocks.    Assessment: 19-year-old female status post [place] butt lift    Plan:  Patient continues to heal well from surgery.  She overall has a very good result.  RTO in 4 weeks. Continue compression garments around-the-clock. May  resume to regular activity. Continue Vit E to all scars.       Pushpa Cabrera PA-C  Plastic & Reconstructive Surgery

## 2025-02-07 ENCOUNTER — OFFICE VISIT (OUTPATIENT)
Age: 20
End: 2025-02-07

## 2025-02-07 VITALS
OXYGEN SATURATION: 98 % | BODY MASS INDEX: 26.5 KG/M2 | SYSTOLIC BLOOD PRESSURE: 104 MMHG | WEIGHT: 144 LBS | HEIGHT: 62 IN | HEART RATE: 80 BPM | TEMPERATURE: 98.3 F | DIASTOLIC BLOOD PRESSURE: 71 MMHG

## 2025-02-07 DIAGNOSIS — Z48.89 ENCOUNTER FOR FOLLOW-UP CARE INVOLVING PLASTIC SURGERY: Primary | ICD-10-CM

## 2025-02-07 PROCEDURE — RECHECK: Performed by: PHYSICIAN ASSISTANT

## 2025-02-07 RX ORDER — IBUPROFEN 800 MG/1
TABLET, FILM COATED ORAL
COMMUNITY
Start: 2025-01-29

## 2025-02-07 RX ORDER — PROMETHAZINE HYDROCHLORIDE 25 MG/1
TABLET ORAL
COMMUNITY
Start: 2025-01-29

## 2025-02-07 NOTE — PROGRESS NOTES
Plastic Surgery Follow Up Note:     HPI: The patient is a 19-year-old female presenting today for a postoperative follow-up status post [facility] BUTT LIFT, fat grafting to bilateral hips and buttock 200mL bilaterally, 400mL total on 11/12/2024.  The procedure went as planned without any complications.  She reports that overall she is doing well and denies any pain, redness, drainage. She has been wearing compression garments around-the-clock.  She has been applying ointment and Vit E to her  scars. Offers no complaints today. She is possibly considering more fat grafting in the future, but is not sure.     Past Medical History:   Diagnosis Date    Acute pharyngitis due to other specified organisms 10/28/2018    Last Assessment & Plan:  Rapid Strep Negative-likely viral Treat with OTC cold medications and analgesics Throat Culture sent to lab Parents advised they will contacted with results within 48 hours if Positive    Anxiety     Anxiety with depression 01/30/2024    Asthma     Depression     Hamstring injury, left, initial encounter 05/31/2018    Headache(784.0)     Heart murmur     PANDAS (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)  ([facility])     SIRS (systemic inflammatory response syndrome) ([facility]) 12/05/2022       Patient Active Problem List   Diagnosis    Neuropathic pain, leg, bilateral    History of PANDAS    Neurologic gait dysfunction    Cough variant asthma    Environmental and seasonal allergies    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    Migraine with aura and without status migrainosus, not intractable    Hand, foot and mouth disease    Anxiety with depression         Current Outpatient Medications:     ascorbic acid (VITAMIN C) 250 mg tablet, Take 250 mg by mouth daily, Disp: , Rfl:     BIOTIN PO, Take 1 capsule by mouth daily, Disp: , Rfl:     cetirizine (ZyrTEC) 10 mg tablet, Take 10 mg by mouth daily, Disp: , Rfl: 5    ibuprofen (MOTRIN) 800 mg tablet,  , Disp: , Rfl:     oxyCODONE (Roxicodone) 5 immediate release tablet, Take 1 tablet (5 mg total) by mouth every 6 (six) hours as needed for severe pain Max Daily Amount: 20 mg, Disp: 10 tablet, Rfl: 0    promethazine (PHENERGAN) 25 mg tablet, , Disp: , Rfl:     saccharomyces boulardii (FLORASTOR) 250 mg capsule, Take 250 mg by mouth 2 (two) times a day, Disp: , Rfl:     VITAMIN D PO, Take by mouth, Disp: , Rfl:     cyclobenzaprine (FLEXERIL) 10 mg tablet, Take 1 tablet (10 mg total) by mouth 3 (three) times a day for 7 days, Disp: 21 tablet, Rfl: 0    enoxaparin (Lovenox) 40 mg/0.4 mL, Inject 0.4 mL (40 mg total) under the skin in the morning for 3 days, Disp: 1.2 mL, Rfl: 0    gabapentin (Neurontin) 300 mg capsule, Take 1 capsule (300 mg total) by mouth 3 (three) times a day for 7 days, Disp: 21 capsule, Rfl: 0    Past Surgical History:   Procedure Laterality Date    EYE SURGERY Left     muscle suregry    NM GRAFTING OF AUTOLOGOUS FAT BY LIPO 50 CC OR LESS N/A 11/12/2024    Procedure: BRAZILLIAN BUTT LIFT;  Surgeon: David Jenkins MD;  Location: [facility];  Service: Plastics       Social History     Tobacco Use    Smoking status: Never     Passive exposure: Never    Smokeless tobacco: Never   Substance Use Topics    Alcohol use: Yes     Comment: social- less than 1 drink per week     Objective:  Vitals:    02/07/25 0942   BP: 104/71   Pulse:    Temp:    SpO2:        Physical Exam:  General: NAD, alert, cooperative    Skin: Liposuction sites of abdomen, hips, flanks, and bilateral upper extremities are clean, dry, and intact.  Fat grafting sites of hips and buttocks are also clean, dry, and intact.  Expected amount of postoperative edema, within normal limits.  There is no evidence of abnormal bruising, drainage, bleeding, or any evidence of infection.  Improved contour of bilateral hips and buttocks.    Assessment: 19-year-old female status post [place] butt lift    Plan:  Patient continues to heal well  from surgery.  She overall has a very good result with great shape and contour of hips and buttocks.  RTO in 2-3 months with Dr Jenkins to discuss possible surgical options. Continue compression garments around-the-clock. May resume to regular activity. Continue Vit E scar massage to all scars.       Pushpa Ferguson PA-C  Plastic & Reconstructive Surgery

## 2025-08-04 ENCOUNTER — TELEPHONE (OUTPATIENT)
Age: 20
End: 2025-08-04

## 2025-08-05 ENCOUNTER — OFFICE VISIT (OUTPATIENT)
Age: 20
End: 2025-08-05
Payer: COMMERCIAL

## 2025-08-05 VITALS
WEIGHT: 142 LBS | HEIGHT: 62 IN | HEART RATE: 81 BPM | BODY MASS INDEX: 26.13 KG/M2 | OXYGEN SATURATION: 100 % | SYSTOLIC BLOOD PRESSURE: 118 MMHG | DIASTOLIC BLOOD PRESSURE: 60 MMHG

## 2025-08-05 DIAGNOSIS — B94.8 PANDAS (PEDIATRIC AUTOIMMUNE NEUROPSYCHIATRIC DISEASE ASSOCIATED WITH STREPTOCOCCAL INFECTION)  (HCC): ICD-10-CM

## 2025-08-05 DIAGNOSIS — Z13.220 SCREENING FOR LIPID DISORDERS: ICD-10-CM

## 2025-08-05 DIAGNOSIS — G43.109 MIGRAINE WITH AURA AND WITHOUT STATUS MIGRAINOSUS, NOT INTRACTABLE: ICD-10-CM

## 2025-08-05 DIAGNOSIS — R53.83 OTHER FATIGUE: ICD-10-CM

## 2025-08-05 DIAGNOSIS — R73.01 IMPAIRED FASTING GLUCOSE: ICD-10-CM

## 2025-08-05 DIAGNOSIS — Z01.419 ENCOUNTER FOR GYNECOLOGICAL EXAMINATION: ICD-10-CM

## 2025-08-05 DIAGNOSIS — E55.9 VITAMIN D DEFICIENCY: ICD-10-CM

## 2025-08-05 DIAGNOSIS — Z00.00 ANNUAL PHYSICAL EXAM: Primary | ICD-10-CM

## 2025-08-05 DIAGNOSIS — F41.8 ANXIETY WITH DEPRESSION: ICD-10-CM

## 2025-08-05 DIAGNOSIS — J45.991 COUGH VARIANT ASTHMA: ICD-10-CM

## 2025-08-05 DIAGNOSIS — D89.89 PANDAS (PEDIATRIC AUTOIMMUNE NEUROPSYCHIATRIC DISEASE ASSOCIATED WITH STREPTOCOCCAL INFECTION)  (HCC): ICD-10-CM

## 2025-08-05 DIAGNOSIS — Z13.0 SCREENING FOR DEFICIENCY ANEMIA: ICD-10-CM

## 2025-08-05 PROBLEM — B08.4 HAND, FOOT AND MOUTH DISEASE: Status: RESOLVED | Noted: 2023-05-15 | Resolved: 2025-08-05

## 2025-08-05 PROCEDURE — 99395 PREV VISIT EST AGE 18-39: CPT

## 2025-08-13 ENCOUNTER — APPOINTMENT (OUTPATIENT)
Age: 20
End: 2025-08-13
Payer: COMMERCIAL

## 2025-08-13 ENCOUNTER — OFFICE VISIT (OUTPATIENT)
Age: 20
End: 2025-08-13
Payer: COMMERCIAL

## 2025-08-13 DIAGNOSIS — R53.83 OTHER FATIGUE: ICD-10-CM

## 2025-08-13 DIAGNOSIS — E55.9 VITAMIN D DEFICIENCY: ICD-10-CM

## 2025-08-13 DIAGNOSIS — Z13.220 SCREENING FOR LIPID DISORDERS: ICD-10-CM

## 2025-08-13 DIAGNOSIS — R73.01 IMPAIRED FASTING GLUCOSE: ICD-10-CM

## 2025-08-13 DIAGNOSIS — Z13.0 SCREENING FOR DEFICIENCY ANEMIA: ICD-10-CM

## 2025-08-13 DIAGNOSIS — Z11.3 SCREENING FOR STD (SEXUALLY TRANSMITTED DISEASE): ICD-10-CM

## 2025-08-13 LAB
ALBUMIN SERPL BCG-MCNC: 4.4 G/DL (ref 3.5–5)
ALP SERPL-CCNC: 54 U/L (ref 34–104)
ALT SERPL W P-5'-P-CCNC: 12 U/L (ref 7–52)
ANION GAP SERPL CALCULATED.3IONS-SCNC: 11 MMOL/L (ref 4–13)
AST SERPL W P-5'-P-CCNC: 14 U/L (ref 13–39)
BASOPHILS # BLD AUTO: 0.03 THOUSANDS/ÂΜL (ref 0–0.1)
BASOPHILS NFR BLD AUTO: 0 % (ref 0–1)
BILIRUB SERPL-MCNC: 0.33 MG/DL (ref 0.2–1)
BUN SERPL-MCNC: 13 MG/DL (ref 5–25)
CALCIUM SERPL-MCNC: 9.2 MG/DL (ref 8.4–10.2)
CHLORIDE SERPL-SCNC: 105 MMOL/L (ref 96–108)
CHOLEST SERPL-MCNC: 168 MG/DL (ref ?–200)
CO2 SERPL-SCNC: 26 MMOL/L (ref 21–32)
CREAT SERPL-MCNC: 0.9 MG/DL (ref 0.6–1.3)
EOSINOPHIL # BLD AUTO: 0.11 THOUSAND/ÂΜL (ref 0–0.61)
EOSINOPHIL NFR BLD AUTO: 1 % (ref 0–6)
ERYTHROCYTE [DISTWIDTH] IN BLOOD BY AUTOMATED COUNT: 12.3 % (ref 11.6–15.1)
EST. AVERAGE GLUCOSE BLD GHB EST-MCNC: 97 MG/DL
GFR SERPL CREATININE-BSD FRML MDRD: 91 ML/MIN/1.73SQ M
GLUCOSE SERPL-MCNC: 76 MG/DL (ref 65–140)
HBA1C MFR BLD: 5 %
HCT VFR BLD AUTO: 41.1 % (ref 34.8–46.1)
HDLC SERPL-MCNC: 68 MG/DL
HGB BLD-MCNC: 13.7 G/DL (ref 11.5–15.4)
IMM GRANULOCYTES # BLD AUTO: 0.05 THOUSAND/UL (ref 0–0.2)
IMM GRANULOCYTES NFR BLD AUTO: 1 % (ref 0–2)
LDLC SERPL CALC-MCNC: 71 MG/DL (ref 0–100)
LYMPHOCYTES # BLD AUTO: 2.6 THOUSANDS/ÂΜL (ref 0.6–4.47)
LYMPHOCYTES NFR BLD AUTO: 24 % (ref 14–44)
MCH RBC QN AUTO: 30.6 PG (ref 26.8–34.3)
MCHC RBC AUTO-ENTMCNC: 33.3 G/DL (ref 31.4–37.4)
MCV RBC AUTO: 92 FL (ref 82–98)
MONOCYTES # BLD AUTO: 0.55 THOUSAND/ÂΜL (ref 0.17–1.22)
MONOCYTES NFR BLD AUTO: 5 % (ref 4–12)
NEUTROPHILS # BLD AUTO: 7.41 THOUSANDS/ÂΜL (ref 1.85–7.62)
NEUTS SEG NFR BLD AUTO: 69 % (ref 43–75)
NONHDLC SERPL-MCNC: 100 MG/DL
NRBC BLD AUTO-RTO: 0 /100 WBCS
PLATELET # BLD AUTO: 273 THOUSANDS/UL (ref 149–390)
PMV BLD AUTO: 9.5 FL (ref 8.9–12.7)
POTASSIUM SERPL-SCNC: 3.7 MMOL/L (ref 3.5–5.3)
PROT SERPL-MCNC: 7.4 G/DL (ref 6.4–8.4)
RBC # BLD AUTO: 4.48 MILLION/UL (ref 3.81–5.12)
SODIUM SERPL-SCNC: 142 MMOL/L (ref 135–147)
TRIGL SERPL-MCNC: 143 MG/DL (ref ?–150)
TSH SERPL DL<=0.05 MIU/L-ACNC: 1.24 UIU/ML (ref 0.45–4.5)
WBC # BLD AUTO: 10.75 THOUSAND/UL (ref 4.31–10.16)

## 2025-08-13 PROCEDURE — 82652 VIT D 1 25-DIHYDROXY: CPT

## 2025-08-13 PROCEDURE — 86803 HEPATITIS C AB TEST: CPT

## 2025-08-13 PROCEDURE — 87491 CHLMYD TRACH DNA AMP PROBE: CPT

## 2025-08-13 PROCEDURE — 87660 TRICHOMONAS VAGIN DIR PROBE: CPT

## 2025-08-13 PROCEDURE — 80053 COMPREHEN METABOLIC PANEL: CPT

## 2025-08-13 PROCEDURE — 84443 ASSAY THYROID STIM HORMONE: CPT

## 2025-08-13 PROCEDURE — 87340 HEPATITIS B SURFACE AG IA: CPT

## 2025-08-13 PROCEDURE — 85025 COMPLETE CBC W/AUTO DIFF WBC: CPT

## 2025-08-13 PROCEDURE — 83036 HEMOGLOBIN GLYCOSYLATED A1C: CPT

## 2025-08-13 PROCEDURE — 87480 CANDIDA DNA DIR PROBE: CPT

## 2025-08-13 PROCEDURE — 87591 N.GONORRHOEAE DNA AMP PROB: CPT

## 2025-08-13 PROCEDURE — 80061 LIPID PANEL: CPT

## 2025-08-13 PROCEDURE — 87510 GARDNER VAG DNA DIR PROBE: CPT

## 2025-08-14 ENCOUNTER — RESULTS FOLLOW-UP (OUTPATIENT)
Dept: OBGYN CLINIC | Facility: CLINIC | Age: 20
End: 2025-08-14

## 2025-08-14 LAB
HBV SURFACE AG SER QL: NORMAL
HCV AB SER QL: NORMAL

## 2025-08-18 LAB — 1,25(OH)2D SERPL-MCNC: 59.9 PG/ML (ref 5–200)

## (undated) DEVICE — PROXIMATE PLUS MD MULTI-DIRECTIONAL RELEASE SKIN STAPLERS CONTAINS 35 STAINLESS STEEL STAPLES APPROXIMATE CLOSED DIMENSIONS: 6.9MM X 3.9MM WIDE: Brand: PROXIMATE

## (undated) DEVICE — POV-IOD SOLUTION 4OZ BT

## (undated) DEVICE — ACE WRAP 6 IN STERILE

## (undated) DEVICE — DRAPE SHEET THREE QUARTER

## (undated) DEVICE — UTILITY MARKER,BLACK WITH LABELS: Brand: DEVON

## (undated) DEVICE — KERLIX BANDAGE ROLL: Brand: KERLIX

## (undated) DEVICE — ABDOMINAL PAD: Brand: DERMACEA

## (undated) DEVICE — INTENDED FOR TISSUE SEPARATION, AND OTHER PROCEDURES THAT REQUIRE A SHARP SURGICAL BLADE TO PUNCTURE OR CUT.: Brand: BARD-PARKER ® CARBON RIB-BACK BLADES

## (undated) DEVICE — 4-PORT MANIFOLD: Brand: NEPTUNE 2

## (undated) DEVICE — Device

## (undated) DEVICE — INVIEW CLEAR LEGGINGS: Brand: CONVERTORS

## (undated) DEVICE — STERILE MUSCLE FLAP PACK: Brand: CARDINAL HEALTH

## (undated) DEVICE — IV CATH 14 G X 3 1/4 IN

## (undated) DEVICE — PACK UNIVERSAL DRAPES SUB-Q ICD

## (undated) DEVICE — GAUZE SPONGES,16 PLY: Brand: CURITY

## (undated) DEVICE — 3M™ STERI-STRIP™ REINFORCED ADHESIVE SKIN CLOSURES, R1547, 1/2 IN X 4 IN (12 MM X 100 MM), 6 STRIPS/ENVELOPE: Brand: 3M™ STERI-STRIP™

## (undated) DEVICE — EXOFIN PRECISION PEN HIGH VISCOSITY TOPICAL SKIN ADHESIVE: Brand: EXOFIN PRECISION PEN, 1G

## (undated) DEVICE — HARVESTER FAT LIPOGRAFTER KIT

## (undated) DEVICE — BINDER ABDOMINAL 30-45 IN

## (undated) DEVICE — GLOVE SRG BIOGEL 7

## (undated) DEVICE — GLOVE INDICATOR PI UNDERGLOVE SZ 7 BLUE

## (undated) DEVICE — SUT MONOCRYL 3-0 PS-2 27 IN Y427H

## (undated) DEVICE — DRAPE TOWEL: Brand: CONVERTORS

## (undated) DEVICE — ACE WRAP 4 IN STERILE